# Patient Record
Sex: MALE | Race: WHITE | NOT HISPANIC OR LATINO | Employment: UNEMPLOYED | URBAN - METROPOLITAN AREA
[De-identification: names, ages, dates, MRNs, and addresses within clinical notes are randomized per-mention and may not be internally consistent; named-entity substitution may affect disease eponyms.]

---

## 2017-05-13 PROBLEM — F15.10 METHAMPHETAMINE ABUSE: Status: ACTIVE | Noted: 2017-05-13

## 2018-05-14 ENCOUNTER — OFFICE VISIT (OUTPATIENT)
Dept: URGENT CARE | Facility: CLINIC | Age: 28
End: 2018-05-14
Payer: MEDICAID

## 2018-05-14 VITALS
HEART RATE: 89 BPM | WEIGHT: 147 LBS | HEIGHT: 70 IN | OXYGEN SATURATION: 99 % | RESPIRATION RATE: 18 BRPM | TEMPERATURE: 99 F | DIASTOLIC BLOOD PRESSURE: 77 MMHG | BODY MASS INDEX: 21.05 KG/M2 | SYSTOLIC BLOOD PRESSURE: 126 MMHG

## 2018-05-14 DIAGNOSIS — J01.40 ACUTE NON-RECURRENT PANSINUSITIS: ICD-10-CM

## 2018-05-14 DIAGNOSIS — J02.9 ACUTE PHARYNGITIS, UNSPECIFIED ETIOLOGY: Primary | ICD-10-CM

## 2018-05-14 PROCEDURE — 99203 OFFICE O/P NEW LOW 30 MIN: CPT | Mod: S$GLB,,, | Performed by: FAMILY MEDICINE

## 2018-05-14 RX ORDER — DEXAMETHASONE SODIUM PHOSPHATE 100 MG/10ML
5 INJECTION INTRAMUSCULAR; INTRAVENOUS
Status: COMPLETED | OUTPATIENT
Start: 2018-05-14 | End: 2018-05-14

## 2018-05-14 RX ORDER — CEFDINIR 300 MG/1
300 CAPSULE ORAL 2 TIMES DAILY
Qty: 20 CAPSULE | Refills: 0 | Status: SHIPPED | OUTPATIENT
Start: 2018-05-14 | End: 2018-05-24

## 2018-05-14 RX ADMIN — DEXAMETHASONE SODIUM PHOSPHATE 5 MG: 100 INJECTION INTRAMUSCULAR; INTRAVENOUS at 01:05

## 2018-05-14 NOTE — LETTER
May 14, 2018  Ismael Gordon Jr.  [address] [address]                [facility]  [address], [address]  [facility] [address]  Phone: [phone]  Fax: [phone] Ismael Gordon was seen and treated in our Urgent Care department   on 5/14/2018. He may return to work in 2 - 3 days.      If you have any questions or concerns, please don't hesitate to call.    Sincerely,        Deangelo Martinez MD

## 2018-05-14 NOTE — PATIENT INSTRUCTIONS
Please drink plenty of fluids.  Please get plenty of rest.  Please return here or go to the Emergency Department for any concerns or worsening of condition.  If you were given wait & see antibiotics, please wait 3-5 days before taking them, and only take them if your symptoms have worsened or not improved.  If you do begin taking the antibiotics, please take them to completion.  If you were prescribed antibiotics, please take them to completion.  If you were prescribed a narcotic medication, do not drive or operate heavy equipment or machinery while taking these medications.    You were given a decongestant (RESCON or POLY VENT Dm).  If your insurance does not cover it or you cannot afford it, it is ok to use the over the counter products listed below.  If you do not have Hypertension or any history of palpitations, it is ok to take over the counter Sudafed or Mucinex D or Allegra-D or Claritin-D or Zyrtec-D.  If you do take one of the above, it is ok to combine that with plain over the counter Mucinex or Allegra or Claritin or Zyrtec.  If for example you are taking Zyrtec -D, you can combine that with Mucinex, but not Mucinex-D.  If you are taking Mucinex-D, you can combine that with plain Allegra or Claritin or Zyrtec.   If you do have Hypertension or palpitations, it is safe to take Coricidin HBP for relief of sinus symptoms.    We recommend you take over the counter Flonase (Fluticasone) or another nasally inhaled steroid unless you are already taking one.  Nasal irrigation with a saline spray or Netti Pot like device per their directions is also recommended.  If not allergic, please take over the counter Tylenol (Acetaminophen) and/or Motrin (Ibuprofen) as directed for control of pain and/or fever.    Robitussin DM 2 teas every 4 hours as needed for cough.  If you  smoke, please stop smoking.    You were given an injection of steroid.  This will relieve swelling and inflammation and improve respiratory  symptoms.  It can raise your blood sugar if you are diabetic.    Please follow up with your primary care doctor or specialist as needed.  Primary Doctor No  None        Acute Bacterial Rhinosinusitis (ABRS)  Acute bacterial rhinosinusitis (ABRS) is an infection of your nasal cavity and sinuses. Its caused by bacteria. Acute means that youve had symptoms for less than 12 weeks.  Understanding your sinuses  The nasal cavity is the large air-filled space behind your nose. The sinuses are a group of spaces formed by the bones of your face. They connect with your nasal cavity. ABRS causes the tissue lining these spaces to become inflamed. Mucus may not drain normally. This leads to facial pain and other symptoms.  What causes ABRS?  ABRS most often follows an upper respiratory infection caused by a virus. Bacteria then infect the lining of your nasal cavity and sinuses. But you can also get ABRS if you have:  · Nasal allergies  · Long-term nasal swelling and congestion not caused by allergies  · Blockage in the nose  Symptoms of ABRS  The symptoms of ABRS may be different for each person, and can include:  · Nasal congestion  · Runny nose  · Fluid draining from the nose down the throat (postnasal drip)  · Headache  · Cough  · Pain in the sinuses  · Thick, colored fluid from the nose (mucus)  · Fever  Diagnosing ABRS  ABRS may be diagnosed if youve had an upper respiratory infection like a cold and cough for longer than 10 to 14 days. Your health care provider will ask about your symptoms and your medical history. The provider will check your vital signs, including your temperature. Youll have a physical exam. The health care provider will check your ears, nose, and throat. You likely wont need any tests. If ABRS comes back, you may have a culture or other tests.  Treatment for ABRS  Treatment may include:  · Antibiotic medicine. This is for symptoms that last for at least 10 to 14 days.  · Nasal corticosteroid  medicine. Drops or spray used in the nose can lessen swelling and congestion.  · Over-the-counter pain medicine. This is to lessen sinus pain and pressure.  · Nasal decongestant medicine. Spray or drops may help to lessen congestion. Do not use them for more than a few days.  · Salt wash (saline irrigation). This can help to loosen mucus.  Possible complications of ABRS  ABRS may come back or become long-term (chronic).  In rare cases, ABRS may cause complications such as:   · Inflamed tissue around the brain and spinal cord (meningitis)  · Inflamed tissue around the eyes (orbital cellulitis)  · Inflamed bones around the sinuses (osteitis)  These problems may need to be treated in a hospital with intravenous (IV) antibiotic medicine or surgery.  When to call the health care provider  Call your health care provider if you have any of the following:  · Symptoms that dont get better, or get worse  · Symptoms that dont get better after 3 to 5 days on antibiotics  · Trouble seeing  · Swelling around your eyes  · Confusion or trouble staying awake   Date Last Reviewed: 3/3/2015  © 1044-7884 [employer]. [address] [place] [address]. All rights reserved. This information is not intended as a substitute for professional medical care. Always follow your healthcare professional's instructions.      Pharyngitis: Strep (Presumed)    You have pharyngitis (sore throat). The cause is thought to be the streptococcus, or strep, bacterium. Strep throat infection can cause throat pain that is worse when swallowing, aching all over, headache, and fever. The infection may be spread by coughing, kissing, or touching others after touching your mouth or nose. Antibiotic medications are given to treat the infection.  Home care  · Rest at home. Drink plenty of fluids to avoid dehydration.  · No work or school for the first 2 days of taking the antibiotics. After this time, you will not be contagious. You can then  return to work or school if you are feeling better.   · The antibiotic medication must be taken for the full 10 days, even if you feel better. This is very important to ensure the infection is treated. It is also important to prevent drug-resistant organisms from developing. If you were given an antibiotic shot, no more antibiotics are needed.  · You may use acetaminophen or ibuprofen to control pain or fever, unless another medicine was prescribed for this. If you have chronic liver or kidney disease or ever had a stomach ulcer or GI bleeding, talk with your doctor before using these medicines.  · Throat lozenges or a throat-numbing sprays can help reduce throat pain. Gargling with warm salt water can also help. Dissolve 1/2 teaspoon of salt in 1 8 ounce glass of warm water.   · Avoid salty or spicy foods, which can irritate the throat.  Follow-up care  Follow up with your healthcare provider or our staff if you are not improving over the next week.  When to seek medical advice  Call your healthcare provider right away if any of these occur:  · Fever as directed by your doctor.   · New or worsening ear pain, sinus pain, or headache  · Painful lumps in the back of neck  · Stiff neck  · Lymph nodes are getting larger  · Inability to swallow liquids, excessive drooling, or inability to open mouth wide due to throat pain  · Signs of dehydration (very dark urine or no urine, sunken eyes, dizziness)  · Trouble breathing or noisy breathing  · Muffled voice  · New rash  Date Last Reviewed: 4/13/2015  © 7023-5178 [employer], "[employer] [address] [place] [address], [place] [address]. All rights reserved. This information is not intended as a substitute for professional medical care. Always follow your healthcare professional's instructions.

## 2018-05-14 NOTE — PROGRESS NOTES
"Subjective:       Patient ID: Ismael Gordon Jr. is a 27 y.o. male.    Vitals:  height is 5' 10" (1.778 m) and weight is 66.7 kg (147 lb). His temperature is 98.5 °F (36.9 °C). His blood pressure is 126/77 and his pulse is 89. His respiration is 18 and oxygen saturation is 99%.     Chief Complaint: Sinus Problem    Sinus Problem   Episode onset: 1 week. The problem has been gradually worsening since onset. There has been no fever. He is experiencing no pain. Associated symptoms include congestion, coughing and a sore throat. Pertinent negatives include no chills, ear pain, headaches, hoarse voice or shortness of breath. Past treatments include oral decongestants. The treatment provided no relief.     Review of Systems   Constitution: Negative for chills, fever and malaise/fatigue.   HENT: Positive for congestion and sore throat. Negative for ear pain and hoarse voice.    Eyes: Negative for blurred vision, discharge and redness.   Cardiovascular: Negative for chest pain, dyspnea on exertion and leg swelling.   Respiratory: Positive for cough. Negative for shortness of breath, sputum production and wheezing.    Skin: Negative for rash.   Musculoskeletal: Negative for back pain, joint pain and myalgias.   Gastrointestinal: Negative for abdominal pain, diarrhea, nausea and vomiting.   Neurological: Negative for headaches.   Psychiatric/Behavioral: The patient is not nervous/anxious.        Objective:      Physical Exam   Constitutional: He is oriented to person, place, and time. He appears well-developed and well-nourished. He is cooperative.  Non-toxic appearance. He does not appear ill. No distress.   HENT:   Head: Normocephalic and atraumatic.   Right Ear: Hearing, tympanic membrane, external ear and ear canal normal.   Left Ear: Hearing, tympanic membrane, external ear and ear canal normal.   Nose: No mucosal edema, rhinorrhea or nasal deformity. No epistaxis. Right sinus exhibits maxillary sinus tenderness and " frontal sinus tenderness. Left sinus exhibits maxillary sinus tenderness and frontal sinus tenderness.   Mouth/Throat: Uvula is midline and mucous membranes are normal. No trismus in the jaw. Normal dentition. No uvula swelling. Posterior oropharyngeal edema and posterior oropharyngeal erythema present.   Eyes: Conjunctivae and lids are normal. No scleral icterus.   Neck: Trachea normal, full passive range of motion without pain and phonation normal. Neck supple.   Cardiovascular: Normal rate, regular rhythm, normal heart sounds, intact distal pulses and normal pulses.    Pulmonary/Chest: Effort normal and breath sounds normal. No respiratory distress.   Abdominal: Soft. Normal appearance and bowel sounds are normal. He exhibits no distension. There is no tenderness.   Musculoskeletal: Normal range of motion. He exhibits no edema or deformity.   Neurological: He is alert and oriented to person, place, and time. He exhibits normal muscle tone. Coordination normal.   Skin: Skin is warm, dry and intact. He is not diaphoretic. No pallor.   Psychiatric: He has a normal mood and affect. His speech is normal and behavior is normal. Judgment and thought content normal. Cognition and memory are normal.   Nursing note and vitals reviewed.      Assessment:       1. Acute pharyngitis, unspecified etiology    2. Acute non-recurrent pansinusitis        Plan:         Acute pharyngitis, unspecified etiology    Acute non-recurrent pansinusitis  -     cefdinir (OMNICEF) 300 MG capsule; Take 1 capsule (300 mg total) by mouth 2 (two) times daily.  Dispense: 20 capsule; Refill: 0  -     pseudoephedrine-DM-guaifenesin (POLY-VENT DM) 60- mg Tab; Take 1 tablet by mouth every 6 (six) hours as needed.  Dispense: 20 tablet; Refill: 0  -     dexamethasone injection 5 mg; Inject 0.5 mLs (5 mg total) into the muscle one time.      Please drink plenty of fluids.  Please get plenty of rest.  Please return here or go to the Emergency  Department for any concerns or worsening of condition.  If you were given wait & see antibiotics, please wait 3-5 days before taking them, and only take them if your symptoms have worsened or not improved.  If you do begin taking the antibiotics, please take them to completion.  If you were prescribed antibiotics, please take them to completion.  If you were prescribed a narcotic medication, do not drive or operate heavy equipment or machinery while taking these medications.    You were given a decongestant (RESCON or POLY VENT Dm).  If your insurance does not cover it or you cannot afford it, it is ok to use the over the counter products listed below.  If you do not have Hypertension or any history of palpitations, it is ok to take over the counter Sudafed or Mucinex D or Allegra-D or Claritin-D or Zyrtec-D.  If you do take one of the above, it is ok to combine that with plain over the counter Mucinex or Allegra or Claritin or Zyrtec.  If for example you are taking Zyrtec -D, you can combine that with Mucinex, but not Mucinex-D.  If you are taking Mucinex-D, you can combine that with plain Allegra or Claritin or Zyrtec.   If you do have Hypertension or palpitations, it is safe to take Coricidin HBP for relief of sinus symptoms.    We recommend you take over the counter Flonase (Fluticasone) or another nasally inhaled steroid unless you are already taking one.  Nasal irrigation with a saline spray or Netti Pot like device per their directions is also recommended.  If not allergic, please take over the counter Tylenol (Acetaminophen) and/or Motrin (Ibuprofen) as directed for control of pain and/or fever.    Robitussin DM 2 teas every 4 hours as needed for cough.  If you  smoke, please stop smoking.    You were given an injection of steroid.  This will relieve swelling and inflammation and improve respiratory symptoms.  It can raise your blood sugar if you are diabetic.    Please follow up with your primary care  doctor or specialist as needed.  Primary Doctor No  None

## 2018-05-17 ENCOUNTER — TELEPHONE (OUTPATIENT)
Dept: URGENT CARE | Facility: CLINIC | Age: 28
End: 2018-05-17

## 2018-07-05 ENCOUNTER — OFFICE VISIT (OUTPATIENT)
Dept: URGENT CARE | Facility: CLINIC | Age: 28
End: 2018-07-05
Payer: MEDICAID

## 2018-07-05 VITALS
WEIGHT: 147 LBS | HEIGHT: 70 IN | OXYGEN SATURATION: 100 % | TEMPERATURE: 99 F | HEART RATE: 101 BPM | DIASTOLIC BLOOD PRESSURE: 98 MMHG | SYSTOLIC BLOOD PRESSURE: 140 MMHG | RESPIRATION RATE: 19 BRPM | BODY MASS INDEX: 21.05 KG/M2

## 2018-07-05 DIAGNOSIS — S50.01XA CONTUSION OF RIGHT ELBOW, INITIAL ENCOUNTER: ICD-10-CM

## 2018-07-05 DIAGNOSIS — M25.521 RIGHT ELBOW PAIN: Primary | ICD-10-CM

## 2018-07-05 PROCEDURE — 99214 OFFICE O/P EST MOD 30 MIN: CPT | Mod: S$GLB,,, | Performed by: FAMILY MEDICINE

## 2018-07-05 RX ORDER — NAPROXEN 500 MG/1
500 TABLET ORAL 2 TIMES DAILY WITH MEALS
Qty: 20 TABLET | Refills: 0 | Status: ON HOLD | OUTPATIENT
Start: 2018-07-05 | End: 2021-08-23

## 2018-07-05 RX ORDER — TRAMADOL HYDROCHLORIDE 50 MG/1
50 TABLET ORAL EVERY 6 HOURS PRN
Qty: 20 TABLET | Refills: 0 | Status: SHIPPED | OUTPATIENT
Start: 2018-07-05 | End: 2019-07-05

## 2018-07-05 NOTE — LETTER
July 5, 2018  Ismael Gordon Jr.  [address] [address]                [facility]  [address]  [facility] [address]  Phone: [phone]  Fax: [phone] Ismael Gordon was seen and treated in our Urgent Care department   on 7/5/2018. He may return to work in 2 - 3 days.      If you have any questions or concerns, please don't hesitate to call.    Sincerely,        Deangelo Martinez MD

## 2018-07-06 NOTE — PROGRESS NOTES
"Subjective:       Patient ID: Ismael Gordon Jr. is a 27 y.o. male.    Vitals:  height is 5' 10" (1.778 m) and weight is 66.7 kg (147 lb). His temperature is 98.9 °F (37.2 °C). His blood pressure is 140/98 (abnormal) and his pulse is 101. His respiration is 19 and oxygen saturation is 100%.     Chief Complaint: Elbow Pain    Elbow Pain   Episode onset: 2 weeks. The problem has been gradually worsening. Associated symptoms include joint swelling. Pertinent negatives include no abdominal pain, chest pain, chills, fever, headaches, nausea, rash, sore throat or vomiting. The symptoms are aggravated by bending. He has tried ice and acetaminophen for the symptoms. The treatment provided no relief.     Review of Systems   Constitution: Negative for chills and fever.   HENT: Negative for sore throat.    Eyes: Negative for blurred vision.   Cardiovascular: Negative for chest pain.   Respiratory: Negative for shortness of breath.    Skin: Negative for rash.   Musculoskeletal: Positive for joint pain (RT elbow/ pain radiating to forearm) and joint swelling. Negative for back pain.   Gastrointestinal: Negative for abdominal pain, diarrhea, nausea and vomiting.   Neurological: Negative for headaches.   Psychiatric/Behavioral: The patient is not nervous/anxious.        Objective:      Physical Exam   Constitutional: He is oriented to person, place, and time. He appears well-developed and well-nourished. He is cooperative.  Non-toxic appearance. He does not appear ill. No distress.   HENT:   Head: Normocephalic and atraumatic. Head is without abrasion, without contusion and without laceration.   Right Ear: Hearing, tympanic membrane, external ear and ear canal normal. No hemotympanum.   Left Ear: Hearing, tympanic membrane, external ear and ear canal normal. No hemotympanum.   Nose: Nose normal. No mucosal edema, rhinorrhea or nasal deformity. No epistaxis. Right sinus exhibits no maxillary sinus tenderness and no frontal sinus " tenderness. Left sinus exhibits no maxillary sinus tenderness and no frontal sinus tenderness.   Mouth/Throat: Uvula is midline, oropharynx is clear and moist and mucous membranes are normal. No trismus in the jaw. Normal dentition. No uvula swelling. No posterior oropharyngeal erythema.   Eyes: Conjunctivae, EOM and lids are normal. Pupils are equal, round, and reactive to light. Right eye exhibits no discharge. Left eye exhibits no discharge. No scleral icterus.   Sclera clear bilat   Neck: Trachea normal, normal range of motion, full passive range of motion without pain and phonation normal. Neck supple. No spinous process tenderness and no muscular tenderness present. No neck rigidity. No tracheal deviation present.   Cardiovascular: Normal rate, regular rhythm, normal heart sounds, intact distal pulses and normal pulses.    Pulmonary/Chest: Effort normal and breath sounds normal. No respiratory distress.   Abdominal: Soft. Normal appearance and bowel sounds are normal. He exhibits no distension, no pulsatile midline mass and no mass. There is no tenderness.   Musculoskeletal: He exhibits no edema or deformity.        Right elbow: He exhibits decreased range of motion and swelling. Tenderness found. Radial head tenderness noted.   Neurological: He is alert and oriented to person, place, and time. He has normal strength. No cranial nerve deficit or sensory deficit. He exhibits normal muscle tone. He displays no seizure activity. Coordination normal. GCS eye subscore is 4. GCS verbal subscore is 5. GCS motor subscore is 6.   Skin: Skin is warm, dry and intact. Capillary refill takes less than 2 seconds. No abrasion, no bruising, no burn, no ecchymosis and no laceration noted. He is not diaphoretic. No pallor.   Psychiatric: He has a normal mood and affect. His speech is normal and behavior is normal. Judgment and thought content normal. Cognition and memory are normal.   Nursing note and vitals reviewed.    Type  of Interpretation: ED Physician (Independently Interpreted).  Radiology Procedure Done: Right Elbow.  Interpretation: No fx seen.        Assessment:       1. Right elbow pain    2. Contusion of right elbow, initial encounter        Plan:         Right elbow pain  -     X-Ray Elbow Complete 3 views Right; Future; Expected date: 07/05/2018    Contusion of right elbow, initial encounter  -     naproxen (NAPROSYN) 500 MG tablet; Take 1 tablet (500 mg total) by mouth 2 (two) times daily with meals.  Dispense: 20 tablet; Refill: 0  -     traMADol (ULTRAM) 50 mg tablet; Take 1 tablet (50 mg total) by mouth every 6 (six) hours as needed for Pain.  Dispense: 20 tablet; Refill: 0  -     SLING FOR HOME USE      Please drink plenty of fluids.  Please get plenty of rest.  Please return here or go to the Emergency Department for any concerns or worsening of condition.  If you were prescribed a narcotic medication, do not drive or operate heavy equipment or machinery while taking these medications.  If you were not prescribed an anti-inflammatory medication, and if you do not have any history of stomach/intestinal ulcers, or kidney disease, or are not taking a blood thinner such as Coumadin, Plavix, Pradaxa, Eloquis, or Xaralta for example, it is OK to take over the counter Ibuprofen or Advil or Motrin or Aleve as directed.  Do not take these medications on an empty stomach.  Rest, ice, compression and elevation to the affected joint or limb as needed.    If you were given a sling, wear it for comfort until follow up as arranged.  If you were given or placed in a splint, wear it until your follow up visit or recheck.  If you  smoke, please stop smoking.       Please follow up with your primary care doctor or specialist as needed.    Primary Doctor No  None

## 2018-09-23 ENCOUNTER — OFFICE VISIT (OUTPATIENT)
Dept: URGENT CARE | Facility: CLINIC | Age: 28
End: 2018-09-23
Payer: MEDICAID

## 2018-09-23 VITALS
TEMPERATURE: 98 F | HEART RATE: 63 BPM | DIASTOLIC BLOOD PRESSURE: 75 MMHG | SYSTOLIC BLOOD PRESSURE: 123 MMHG | WEIGHT: 147 LBS | OXYGEN SATURATION: 98 % | HEIGHT: 70 IN | BODY MASS INDEX: 21.05 KG/M2

## 2018-09-23 DIAGNOSIS — S13.9XXA NECK SPRAIN, INITIAL ENCOUNTER: Primary | ICD-10-CM

## 2018-09-23 PROCEDURE — 99214 OFFICE O/P EST MOD 30 MIN: CPT | Mod: S$GLB,,, | Performed by: FAMILY MEDICINE

## 2018-09-23 RX ORDER — KETOROLAC TROMETHAMINE 30 MG/ML
30 INJECTION, SOLUTION INTRAMUSCULAR; INTRAVENOUS ONCE
Status: COMPLETED | OUTPATIENT
Start: 2018-09-23 | End: 2018-09-23

## 2018-09-23 RX ORDER — CHLORZOXAZONE 500 MG/1
500 TABLET ORAL 4 TIMES DAILY PRN
Qty: 40 TABLET | Refills: 0 | Status: SHIPPED | OUTPATIENT
Start: 2018-09-23 | End: 2018-10-03

## 2018-09-23 RX ORDER — NAPROXEN 500 MG/1
500 TABLET ORAL 2 TIMES DAILY WITH MEALS
Qty: 20 TABLET | Refills: 0 | Status: ON HOLD | OUTPATIENT
Start: 2018-09-23 | End: 2021-08-23

## 2018-09-23 RX ADMIN — KETOROLAC TROMETHAMINE 30 MG: 30 INJECTION, SOLUTION INTRAMUSCULAR; INTRAVENOUS at 02:09

## 2018-09-23 NOTE — PROGRESS NOTES
"Subjective:       Patient ID: Ismael Gordon Jr. is a 27 y.o. male.    Vitals:  height is 5' 10" (1.778 m) and weight is 66.7 kg (147 lb). His oral temperature is 98.1 °F (36.7 °C). His blood pressure is 123/75 and his pulse is 63. His oxygen saturation is 98%.     Chief Complaint: Back Pain    Pt believes he pulled a muscle in his upper back towards his left shoulder.      Back Pain   This is a new problem. The current episode started yesterday. The problem occurs constantly. The problem is unchanged. The pain is present in the thoracic spine. The quality of the pain is described as aching (tightness). The pain is at a severity of 7/10. The pain is moderate. The symptoms are aggravated by lying down. Pertinent negatives include no abdominal pain, chest pain, dysuria, fever, headaches, leg pain, numbness, paresis, perianal numbness, tingling or weakness. He has tried heat for the symptoms. The treatment provided mild relief.     Review of Systems   Constitution: Negative for chills, fever and weakness.   HENT: Negative for sore throat.    Eyes: Negative for blurred vision.   Cardiovascular: Negative for chest pain.   Respiratory: Negative for shortness of breath.    Skin: Negative for rash.   Musculoskeletal: Positive for back pain. Negative for joint pain, muscle cramps, muscle weakness and myalgias.        Pt states his upper back feels sore     Gastrointestinal: Negative for abdominal pain, diarrhea, nausea and vomiting.   Genitourinary: Negative for dysuria.   Neurological: Negative for headaches, numbness and tingling.   Psychiatric/Behavioral: The patient is not nervous/anxious.        Objective:      Physical Exam   Constitutional: He is oriented to person, place, and time. He appears well-developed and well-nourished. He is cooperative.  Non-toxic appearance. He does not appear ill. No distress.   HENT:   Head: Normocephalic and atraumatic. Head is without abrasion, without contusion and without " laceration.   Right Ear: Hearing, tympanic membrane, external ear and ear canal normal. No hemotympanum.   Left Ear: Hearing, tympanic membrane, external ear and ear canal normal. No hemotympanum.   Nose: Nose normal. No mucosal edema, rhinorrhea or nasal deformity. No epistaxis. Right sinus exhibits no maxillary sinus tenderness and no frontal sinus tenderness. Left sinus exhibits no maxillary sinus tenderness and no frontal sinus tenderness.   Mouth/Throat: Uvula is midline, oropharynx is clear and moist and mucous membranes are normal. No trismus in the jaw. Normal dentition. No uvula swelling. No posterior oropharyngeal erythema.   Eyes: Conjunctivae, EOM and lids are normal. Pupils are equal, round, and reactive to light. Right eye exhibits no discharge. Left eye exhibits no discharge. No scleral icterus.   Sclera clear bilat   Neck: Trachea normal, full passive range of motion without pain and phonation normal. Muscular tenderness present. No spinous process tenderness present. Neck rigidity present. Decreased range of motion present. No tracheal deviation present.       Cardiovascular: Normal rate, regular rhythm, normal heart sounds, intact distal pulses and normal pulses.   Pulmonary/Chest: Effort normal and breath sounds normal. No respiratory distress.   Abdominal: Soft. Normal appearance and bowel sounds are normal. He exhibits no distension, no pulsatile midline mass and no mass. There is no tenderness.   Musculoskeletal: He exhibits no edema or deformity.        Cervical back: He exhibits decreased range of motion, tenderness and spasm.        Back:    Neurological: He is alert and oriented to person, place, and time. He has normal strength. No cranial nerve deficit or sensory deficit. He exhibits normal muscle tone. He displays no seizure activity. Coordination normal. GCS eye subscore is 4. GCS verbal subscore is 5. GCS motor subscore is 6.   Skin: Skin is warm, dry and intact. Capillary refill takes  less than 2 seconds. No abrasion, no bruising, no burn, no ecchymosis and no laceration noted. He is not diaphoretic. No pallor.   Psychiatric: He has a normal mood and affect. His speech is normal and behavior is normal. Judgment and thought content normal. Cognition and memory are normal.   Nursing note and vitals reviewed.    Type of Interpretation: ED Physician (Independently Interpreted).  Interpretation: Cervical spasm, no fx.    T spine - no fx.      Assessment:       1. Neck sprain, initial encounter        Plan:         Neck sprain, initial encounter  -     ketorolac injection 30 mg; Inject 1 mL (30 mg total) into the muscle once.  -     X-Ray Cervical Spine 2 or 3 Views; Future; Expected date: 09/23/2018  -     X-Ray Thoracic Spine AP Lateral; Future; Expected date: 09/23/2018  -     naproxen (NAPROSYN) 500 MG tablet; Take 1 tablet (500 mg total) by mouth 2 (two) times daily with meals.  Dispense: 20 tablet; Refill: 0  -     chlorzoxazone (PARAFON FORTE) 500 mg Tab; Take 1 tablet (500 mg total) by mouth 4 (four) times daily as needed.  Dispense: 40 tablet; Refill: 0    Please drink plenty of fluids.  Please get plenty of rest.  Please return here or go to the Emergency Department for any concerns or worsening of condition.  If you were prescribed a narcotic medication, do not drive or operate heavy equipment or machinery while taking these medications.  If you were not prescribed an anti-inflammatory medication, and if you do not have any history of stomach/intestinal ulcers, or kidney disease, or are not taking a blood thinner such as Coumadin, Plavix, Pradaxa, Eloquis, or Xaralta for example, it is OK to take over the counter Ibuprofen or Advil or Motrin or Aleve as directed.  Do not take these medications on an empty stomach.  If you lose control of your bowel and/or bladder, please go to the nearest Emergency Department immediately.  If you lose sensation in between your legs by your genitalia and/or  rectum, please go to the nearest Emergency Department immediately.  If you lose control or sensation of any extremity, please go to the nearest Emergency Department immediately.    Moist heat (heating Pad) several times a day to back for relief and comfort.  If you  smoke, please stop smoking.    Please follow up with your primary care doctor or specialist as needed.  Primary Doctor No  None

## 2018-09-23 NOTE — LETTER
September 23, 2018  Ismael Gordon Jr.  [address] [address]                [facility]  [address], [address]  [place] [address]  Phone: [phone]  Fax: [phone] Ismael Gordon was seen and treated in our Urgent Care department   on 9/23/2018. He may return to work in 2 - 3 days.      If you have any questions or concerns, please don't hesitate to call.    Sincerely,        Deangelo Martinez MD

## 2018-09-23 NOTE — PATIENT INSTRUCTIONS
Please drink plenty of fluids.  Please get plenty of rest.  Please return here or go to the Emergency Department for any concerns or worsening of condition.  If you were prescribed a narcotic medication, do not drive or operate heavy equipment or machinery while taking these medications.  If you were not prescribed an anti-inflammatory medication, and if you do not have any history of stomach/intestinal ulcers, or kidney disease, or are not taking a blood thinner such as Coumadin, Plavix, Pradaxa, Eloquis, or Xaralta for example, it is OK to take over the counter Ibuprofen or Advil or Motrin or Aleve as directed.  Do not take these medications on an empty stomach.  If you lose control of your bowel and/or bladder, please go to the nearest Emergency Department immediately.  If you lose sensation in between your legs by your genitalia and/or rectum, please go to the nearest Emergency Department immediately.  If you lose control or sensation of any extremity, please go to the nearest Emergency Department immediately.    Moist heat (heating Pad) several times a day to back for relief and comfort.  If you  smoke, please stop smoking.    Please follow up with your primary care doctor or specialist as needed.  Primary Doctor No  None      General Neck and Back Pain    Both neck and back pain are usually caused by injury to the muscles or ligaments of the spine. Sometimes the disks that separate each bone of the spine may cause pain by pressing on a nearby nerve. Back and neck pain may appear after a sudden twisting or bending force (such as in a car accident), or sometimes after a simple awkward movement. In either case, muscle spasm is often present and adds to the pain.  Acute neck and back pain usually gets better in 1 to 2 weeks. Pain related to disk disease, arthritis in the spinal joints or spinal stenosis (narrowing of the spinal canal) can become chronic and last for months or years.  Back and neck pain are common  problems. Most people feel better in 1 or 2 weeks, and most of the rest in 1 to 2 months. Most people can remain active.  People experience and describe pain differently.  · Pain can be sharp, stabbing, shooting, aching, cramping, or burning  · Movement, standing, bending, lifting, sitting, or walking may worsen the pain  · Pain can be localized to one spot or area, or it can be more generalized  · Pain can spread or radiate upwards, downwards, to the front, or go down your arms  · Muscle spasm may occur.  Most of the time mechanical problems with the muscles or spine cause the pain. it is usually caused by an injury, whether known or not, to the muscles or ligaments. While illnesses can cause back pain, it is usually not caused by a serious illness. Pain is usually related to physical activity, whether sports, exercise, work, or normal activity. Sometimes it can occur without an identifiable cause. This can happen simply by stretching or moving wrong, without noting pain at the time. Other causes include:  · Overexertion, lifting, pushing, pulling incorrectly or too aggressively.  · Sudden twisting, bending or stretching from an accident (car or fall), or accidental movement.  · Poor posture  · Poor conditioning, lack of regular exercise  · Spinal disc disease or arthritis  · Stress  · Pregnancy, or illness like appendicitis, bladder or kidney infection, pelvic infections   Home care  · For neck pain: Use a comfortable pillow that supports the head and keeps the spine in a neutral position. The position of the head should not be tilted forward or backward.  · When in bed, try to find a position of comfort. A firm mattress is best. Try lying flat on your back with pillows under your knees. You can also try lying on your side with your knees bent up towards your chest and a pillow between your knees.  · At first, do not try to stretch out the sore spots. If there is a strain, it is not like the good soreness you get  after exercising without an injury. In this case, stretching may make it worse.  · Avoid prolonged sitting, long car rides or travel. This puts more stress on the lower back than standing or walking.  · During the first 24 to 72 hours after an injury, apply an ice pack to the painful area for 20 minutes and then remove it for 20 minutes over a period of 60 to 90 minutes or several times a day.   · You can alternate ice and heat therapies. Talk with your healthcare provider about the best treatment for your back or neck pain. As a safety precaution, do not use a heating pad at bedtime. Sleeping with a heating pad can lead to skin burns or tissue damage.  · Therapeutic massage can help relax the back and neck muscles without stretching them.  · Be aware of safe lifting methods and do not lift anything over 15 pounds until all the pain is gone.  Medications  Talk to your healthcare provider before using medicine, especially if you have other medical problems or are taking other medicines.  · You may use over-the-counter medicine to control pain, unless another pain medicine was prescribed. If you have chronic conditions like diabetes, liver or kidney disease, stomach ulcers,  gastrointestinal bleeding, or are taking blood thinner medicines.  · Be careful if you are given pain medicines, narcotics, or medicine for muscle spasm. They can cause drowsiness, and can affect your coordination, reflexes, and judgment. Do not drive or operate heavy machinery.  Follow-up care  Follow up with your healthcare provider, or as advised. Physical therapy or further tests may be needed.  If X-rays were taken, you will be notified of any new findings that may affect your care.  Call 911  Seek emergency medical care if any of the following occur:  · Trouble breathing  · Confusion  · Very drowsy or trouble awakening  · Fainting or loss of consciousness  · Rapid or very slow heart rate  · Loss of bowel or bladder control  When to seek  medical advice  Call your healthcare provider right away if any of these occur:  · Pain becomes worse or spreads into your arms or legs  · Weakness, numbness or pain in one or both arms or legs  · Numbness in the groin area  · Difficulty walking  · Fever of 100.4ºF (38ºC) or higher, or as directed by your healthcare provider  Date Last Reviewed: 7/1/2016 © 2000-2016 [employer]. [address] [place] [address], [place] [address]. All rights reserved. This information is not intended as a substitute for professional medical care. Always follow your healthcare professional's instructions.      Back Pain (Acute or Chronic)    Back pain is one of the most common problems. The good news is that most people feel better in 1 to 2 weeks, and most of the rest in 1 to 2 months. Most people can remain active.  People experience and describe pain differently; not everyone is the same.  · The pain can be sharp, stabbing, shooting, aching, cramping or burning.  · Movement, standing, bending, lifting, sitting, or walking may worsen pain.  · It can be localized to one spot or area, or it can be more generalized.  · It can spread or radiate upwards, to the front, or go down your arms or legs (sciatica).  · It can cause muscle spasm.  Most of the time, mechanical problems with the muscles or spine cause the pain. Mechanical problems are usually caused by an injury to the muscles or ligaments. While illness can cause back pain, it is usually not caused by a serious illness. Mechanical problems include:   · Physical activity such as sports, exercise, work, or normal activity  · Overexertion, lifting, pushing, pulling incorrectly or too aggressively  · Sudden twisting, bending, or stretching from an accident, or accidental movement  · Poor posture  · Stretching or moving wrong, without noticing pain at the time  · Poor coordination, lack of regular exercise (check with your doctor about this)  · Spinal disc disease or  arthritis  · Stress  Pain can also be related to pregnancy, or illness like appendicitis, bladder or kidney infections, pelvic infections, and many other things.  Acute back pain usually gets better in 1 to 2 weeks. Back pain related to disk disease, arthritis in the spinal joints or spinal stenosis (narrowing of the spinal canal) can become chronic and last for months or years.  Unless you had a physical injury (for example, a car accident or fall) X-rays are usually not needed for the initial evaluation of back pain. If pain continues and does not respond to medical treatment, X-rays and other tests may be needed.  Home care  Try these home care recommendations:  · When in bed, try to find a position of comfort. A firm mattress is best. Try lying flat on your back with pillows under your knees. You can also try lying on your side with your knees bent up towards your chest and a pillow between your knees.  · At first, do not try to stretch out the sore spots. If there is a strain, it is not like the good soreness you get after exercising without an injury. In this case, stretching may make it worse.  · Avoid prolong sitting, long car rides, or travel. This puts more stress on the lower back than standing or walking.  · During the first 24 to 72 hours after an acute injury or flare up of chronic back pain, apply an ice pack to the painful area for 20 minutes and then remove it for 20 minutes. Do this over a period of 60 to 90 minutes or several times a day. This will reduce swelling and pain. Wrap the ice pack in a thin towel or plastic to protect your skin.  · You can start with ice, then switch to heat. Heat (hot shower, hot bath, or heating pad) reduces pain and works well for muscle spasms. Heat can be applied to the painful area for 20 minutes then remove it for 20 minutes. Do this over a period of 60 to 90 minutes or several times a day. Do not sleep on a heating pad. It can lead to skin burns or tissue  damage.  · You can alternate ice and heat therapy. Talk with your doctor about the best treatment for your back pain.  · Therapeutic massage can help relax the back muscles without stretching them.  · Be aware of safe lifting methods and do not lift anything without stretching first.  Medicines  Talk to your doctor before using medicine, especially if you have other medical problems or are taking other medicines.  · You may use over-the-counter medicine as directed on the bottle to control pain, unless another pain medicine was prescribed. If you have chronic conditions like diabetes, liver or kidney disease, stomach ulcers, or gastrointestinal bleeding, or are taking blood thinners, talk to your doctor before taking any medicine.  · Be careful if you are given a prescription medicines, narcotics, or medicine for muscle spasms. They can cause drowsiness, affect your coordination, reflexes, and judgement. Do not drive or operate heavy machinery.  Follow-up care  Follow up with your healthcare provider, or as advised.   A radiologist will review any X-rays that were taken. Your provide will notify you of any new findings that may affect your care.  Call 911  Call emergency services if any of the following occur:  · Trouble breathing  · Confusion  · Very drowsy or trouble awakening  · Fainting or loss of consciousness  · Rapid or very slow heart rate  · Loss of bowel or bladder control  When to seek medical advice  Call your healthcare provider right away if any of these occur:   · Pain becomes worse or spreads to your legs  · Weakness or numbness in one or both legs  · Numbness in the groin or genital area  Date Last Reviewed: 7/1/2016  © 8039-4265 [employer]. [address] [place] [address], [place] [address]. All rights reserved. This information is not intended as a substitute for professional medical care. Always follow your healthcare professional's instructions.      Back Care Tips    Caring for your  back  These are things you can do to prevent a recurrence of acute back pain and to reduce symptoms from chronic back pain:  · Maintain a healthy weight. If you are overweight, losing weight will help most types of back pain.  · Exercise is an important part of recovery from most types of back pain. The muscles behind and in front of the spine support the back. This means strengthening both the back muscles and the abdominal muscles will provide better support for your spine.   · Swimming and brisk walking are good overall exercises to improve your fitness level.  · Practice safe lifting methods (below).  · Practice good posture when sitting, standing and walking. Avoid prolonged sitting. This puts more stress on the lower back than standing or walking.  · Wear quality shoes with sufficient arch support. Foot and ankle alignment can affect back symptoms. Women should avoid wearing high heels.  · Therapeutic massage can help relax the back muscles without stretching them.  · During the first 24 to 72 hours after an acute injury or flare-up of chronic back pain, apply an ice pack to the painful area for 20 minutes and then remove it for 20 minutes, over a period of 60 to 90 minutes, or several times a day. As a safety precaution, do not use a heating pad at bedtime. Sleeping on a heating pad can lead to skin burns or tissue damage.  · You can alternate ice and heat therapies.  Medications  Talk to your healthcare provider before using medicines, especially if you have other medical problems or are taking other medicines.  · You may use acetaminophen or ibuprofen to control pain, unless your healthcare provider prescribed other pain medicine. If you have chronic conditions like diabetes, liver or kidney disease, stomach ulcers, or gastrointestinal bleeding, or are taking blood thinners, talk with your healthcare provider before taking any medicines.  · Be careful if you are given prescription pain medicines, narcotics,  or medicine for muscle spasm. They can cause drowsiness, affect your coordination, reflexes, and judgment. Do not drive or operate heavy machinery while taking these types of medicines. Take prescription pain medicine only as prescribed by your healthcare provider.  Lumbar stretch  Here is a simple stretching exercise that will help relax muscle spasm and keep your back more limber. If exercise makes your back pain worse, dont do it.  · Lie on your back with your knees bent and both feet on the ground.  · Slowly raise your left knee to your chest as you flatten your lower back against the floor. Hold for 5 seconds.  · Relax and repeat the exercise with your right knee.  · Do 10 of these exercises for each leg.  Safe lifting method  · Dont bend over at the waist to lift an object off the floor.  Instead, bend your knees and hips in a squat.   · Keep your back and head upright  · Hold the object close to your body, directly in front of you.  · Straighten your legs to lift the object.   · Lower the object to the floor in the reverse fashion.  · If you must slide something across the floor, push it.  Posture tips  Sitting  Sit in chairs with straight backs or low-back support. Keep your knees lower than your hips, with your feet flat on the floor.  When driving, sit up straight. Adjust the seat forward so you are not leaning toward the steering wheel.  A small pillow or rolled towel behind your lower back may help if you are driving long distances.   Standing  When standing for long periods, shift most of your weight to one leg at a time. Alternate legs every few minutes.   Sleeping  The best way to sleep is on your side with your knees bent. Put a low pillow under your head to support your neck in a neutral spine position. Avoid thick pillows that bend your neck to one side. Put a pillow between your legs to further relax your lower back. If you sleep on your back, put pillows under your knees to support your legs in  a slightly flexed position. Use a firm mattress. If your mattress sags, replace it, or use a 1/2-inch plywood board under the mattress to add support.  Follow-up care  Follow up with your healthcare provider, or as advised.  If X-rays, a CT scan or an MRI scan were taken, they will be reviewed by a radiologist. You will be notified of any new findings that may affect your care.  Call 911  Seek emergency medical care if any of the following occur:  · Trouble breathing  · Confusion  · Very drowsy  · Fainting or loss of consciousness  · Rapid or very slow heart rate  · Loss of  bowel or bladder control  When to seek medical care  Call your healthcare provider if any of the following occur:  · Pain becomes worse or spreads to your arms or legs  · Weakness or numbness in one or both arms or legs  · Numbness in the groin area  Date Last Reviewed: 6/1/2016  © 8022-0331 [employer]. [address] [place] [address]. All rights reserved. This information is not intended as a substitute for professional medical care. Always follow your healthcare professional's instructions.              Neck Sprain or Strain  A sudden force that causes turning or bending of the neck can cause sprain or strain. An example would be the force from a car accident. This can stretch or tear muscles called a strain. It can also stretch or tear ligaments called a sprain. Either of these can cause neck pain. Sometimes neck pain occurs after a simple awkward movement. In either case, muscle spasm is commonly present and contributes to the pain.     Unless you had a forceful physical injury (for example, a car accident or fall), X-rays are usually not ordered for the initial evaluation of neck pain. If pain continues and dose not respond to medical treatment, X-rays and other tests may be performed at a later time.  Home care  · You may feel more soreness and spasm the first few days after the injury. Rest until symptoms begin to  improve.  · When lying down, use a comfortable pillow or a rolled towel that supports the head and keeps the spine in a neutral position. The position of the head should not be tilted forward or backward.  · Apply an ice pack over the injured area for 15 to 20 minutes every 3 to 6 hours. You should do this for the first 24 to 48 hours. You can make an ice pack by filling a plastic bag that seals at the top with ice cubes and then wrapping it with a thin towel. After 48 hours, apply heat (warm shower or warm bath) for 15 to 20 minutes several times a day, or alternate ice and heat.  · You may use over-the-counter pain medicine to control pain, unless another pain medicine was prescribed. If you have chronic liver or kidney disease or ever had a stomach ulcer or GI bleeding, talk with your healthcare provider before using these medicines.  · If a soft cervical collar was prescribed, it should be worn only for periods of increased pain. It should not be worn for more than 3 hours a day, or for a period longer than 1 to 2 weeks.  Follow-up care  Follow up with your healthcare provider as directed. Physical therapy may be needed.  Sometimes fractures dont show up on the first X-ray. Bruises and sprains can sometimes hurt as much as a fracture. These injuries can take time to heal completely. If your symptoms dont improve or they get worse, talk with your healthcare provider. You may need a repeat X-ray or other tests. If X-rays were taken, you will be told of any new findings that may affect your care.  Call 911  Call 911 if you have:  · Neck swelling, difficulty or painful swallowing  · Difficulty breathing  · Chest pain  When to seek medical advice  Call your healthcare provider right away if any of these occur:  · Pain becomes worse or spreads into your arms  · Weakness or numbness in one or both arms  Date Last Reviewed: 11/19/2015  © 0775-7847 [employer]. [address] [place] [address], [place] [address].  All rights reserved. This information is not intended as a substitute for professional medical care. Always follow your healthcare professional's instructions.

## 2019-07-14 NOTE — PATIENT INSTRUCTIONS
Please drink plenty of fluids.  Please get plenty of rest.  Please return here or go to the Emergency Department for any concerns or worsening of condition.  If you were prescribed a narcotic medication, do not drive or operate heavy equipment or machinery while taking these medications.  If you were not prescribed an anti-inflammatory medication, and if you do not have any history of stomach/intestinal ulcers, or kidney disease, or are not taking a blood thinner such as Coumadin, Plavix, Pradaxa, Eloquis, or Xaralta for example, it is OK to take over the counter Ibuprofen or Advil or Motrin or Aleve as directed.  Do not take these medications on an empty stomach.  Rest, ice, compression and elevation to the affected joint or limb as needed.    If you were given a sling, wear it for comfort until follow up as arranged.  If you were given or placed in a splint, wear it until your follow up visit or recheck.  If you  smoke, please stop smoking.       Please follow up with your primary care doctor or specialist as needed.    Primary Doctor No  None    Elbow Sprain    A sprain is a tearing of the ligaments that hold a joint together. This may take up to 6 weeks to fully heal, depending on how severe it is. Moderate to severe sprains are treated with a sling or splint. Minor sprains can be treated without any special support.  Home care  The following guidelines will help you care for your injury at home:  · Keep your arm elevated to reduce pain and swelling. When sitting or lying down keep your arm above the level of your heart. You can do this by placing your arm on a pillow that rests on your chest or on a pillow at your side. This is most important during the first 2 days (48 hours) after injury.  · Put an ice pack on the injured area. Do this for 20 minutes every 1 to 2 hours the first day. You can make an ice pack by wrapping a plastic bag of ice cubes in a thin towel. As the ice melts, be careful that the splint  doesnt get wet. Continue using the ice pack 3 to 4 times a day for the next 2 days. Then use the ice pack as needed to ease pain and swelling.  · If you were given a plaster or fiberglass splint, leave it on as advised, or until you see your healthcare provider. Keep it dry at all times. Bathe with your splint out of the water. Protect it with a large plastic bag, rubber-banded at the top end. If a fiberglass splint gets wet, you can dry it with a hair dryer. Once the splint is removed, move your elbow through its full range of motion several times a day. This will prevent stiffness.  · If you were given a sling only, begin gradual range-of-motion exercises after the first few days, unless told otherwise. This will prevent stiffness in the elbow. Stop wearing the sling once the pain is better.  · You may use acetaminophen or ibuprofen to control pain, unless another pain medicine was prescribed. If you have chronic liver or kidney disease, talk with your healthcare provider before using these medicines. Also talk with your provider if youve had a stomach ulcer or gastrointestinal bleeding.  Follow-up care  Follow up with your doctor as directed.  Any X-rays you had today dont show any broken bones, breaks, or fractures. Sometimes fractures dont show up on the first X-ray. Bruises and sprains can sometimes hurt as much as a fracture. These injuries can take time to heal completely. If your symptoms dont improve or they get worse, talk with your healthcare provider. You may need a repeat X-ray or other tests.  When to seek medical advice  Call your healthcare provider right away  if any of these occur:  · The plaster splint becomes wet or soft  · The fiberglass splint remains wet for more than 24 hours  · Bad odor from the splint or wound fluid stains the splint  · Splint cracks  · Tightness or pain in the elbow gets worse  · Fingers become swollen, cold, blue, numb, or tingly  · You are less able to move the  elbow, hand or fingers  · Area around splint becomes red, swollen, or irritated  · Fever of 101ºF (38.3ºC) or higher, or as directed by your healthcare provider  Date Last Reviewed: 5/1/2017 © 2000-2017 [employer]. [address] [place] [id number]. All rights reserved. This information is not intended as a substitute for professional medical care. Always follow your healthcare professional's instructions.           ETOP (2006 D&C, 2011 Pills); Uterine fibroids

## 2020-09-22 ENCOUNTER — HOSPITAL ENCOUNTER (EMERGENCY)
Facility: HOSPITAL | Age: 30
Discharge: HOME | End: 2020-09-22
Attending: SURGERY
Payer: MEDICAID

## 2020-09-22 VITALS
WEIGHT: 152.69 LBS | RESPIRATION RATE: 18 BRPM | HEART RATE: 95 BPM | SYSTOLIC BLOOD PRESSURE: 140 MMHG | OXYGEN SATURATION: 98 % | DIASTOLIC BLOOD PRESSURE: 89 MMHG | TEMPERATURE: 98 F | BODY MASS INDEX: 21.91 KG/M2

## 2020-09-22 DIAGNOSIS — L03.113 CELLULITIS OF RIGHT FOREARM: Primary | ICD-10-CM

## 2020-09-22 PROCEDURE — 99283 EMERGENCY DEPT VISIT LOW MDM: CPT

## 2020-09-22 PROCEDURE — 25000003 PHARM REV CODE 250: Performed by: INTERNAL MEDICINE

## 2020-09-22 RX ORDER — DOXYCYCLINE HYCLATE 100 MG
100 TABLET ORAL
Status: COMPLETED | OUTPATIENT
Start: 2020-09-22 | End: 2020-09-22

## 2020-09-22 RX ORDER — DOXYCYCLINE 100 MG/1
100 CAPSULE ORAL 2 TIMES DAILY
Qty: 20 CAPSULE | Refills: 0 | Status: SHIPPED | OUTPATIENT
Start: 2020-09-22 | End: 2020-10-02

## 2020-09-22 RX ADMIN — DOXYCYCLINE HYCLATE 100 MG: 100 TABLET, COATED ORAL at 11:09

## 2020-09-23 NOTE — ED PROVIDER NOTES
"Encounter Date: 9/22/2020       History     Chief Complaint   Patient presents with    Arm Pain     29-year-old male presents to the emergency department complaining lesion to right forearm times 2 days.  He denies fever/chills/nausea/vomiting/chest pain/shortness of breath. He states he squeezed lesion, but has not seen fluid come from the lesion.    The history is provided by the patient. No  was used.     Review of patient's allergies indicates:  No Known Allergies  Past Medical History:   Diagnosis Date    Bipolar 1 disorder     Depression     Hepatitis C     History of psychiatric hospitalization     "It has all been to get to previous 28 day programs."    Hx of psychiatric care     Psychiatric exam requested by authority     Psychiatric problem     Suicide attempt 01/16/2017    'I did an overdose on heroin so you could say passive suicide in my opinion."    Therapy      Past Surgical History:   Procedure Laterality Date    CHOLECYSTECTOMY       Family History   Problem Relation Age of Onset    Alcohol abuse Mother     No Known Problems Father     No Known Problems Sister     Drug abuse Brother     Anxiety disorder Maternal Aunt     Drug abuse Maternal Aunt     Drug abuse Paternal Aunt     Drug abuse Maternal Uncle     Anxiety disorder Maternal Uncle     Drug abuse Paternal Uncle     No Known Problems Maternal Grandfather     No Known Problems Maternal Grandmother     No Known Problems Paternal Grandfather     No Known Problems Paternal Grandmother     Drug abuse Cousin     Anxiety disorder Cousin     Alcohol abuse Cousin     No Known Problems Maternal Aunt     Drug abuse Maternal Aunt     No Known Problems Paternal Cousin      Social History     Tobacco Use    Smoking status: Current Every Day Smoker     Packs/day: 0.25     Years: 7.00     Pack years: 1.75     Types: Cigarettes     Start date: 2009    Smokeless tobacco: Current User    Tobacco comment: "By " "default I have quit because I can no longer afford it."    Substance Use Topics    Alcohol use: No    Drug use: Yes     Types: Methamphetamines, Marijuana     Comment: Feb 2015 heroin "but very rarely but when I do I blow out."     Review of Systems   Constitutional: Negative for chills and fever.   Skin:        Skin lesion   All other systems reviewed and are negative.      Physical Exam     Initial Vitals [09/22/20 2315]   BP Pulse Resp Temp SpO2   (!) 140/89 95 18 98.2 °F (36.8 °C) 98 %      MAP       --         Physical Exam    Nursing note and vitals reviewed.  Constitutional: He appears well-developed. He is not diaphoretic. No distress.   HENT:   Head: Normocephalic and atraumatic.   Eyes: Conjunctivae are normal.   Cardiovascular: Normal rate and regular rhythm.   Pulmonary/Chest: Breath sounds normal. No respiratory distress.   Abdominal: Soft.   Musculoskeletal: Normal range of motion.   Neurological: He is alert.   Skin: Skin is warm and dry.   Dorsal right forearm with induration, erythema (approximately 4 cm diameter), and no fluctuance.  There is a central ulcerated area approximately 0.5 cm diameter without drainage from the lesion   Psychiatric: He has a normal mood and affect.         ED Course   Procedures  Labs Reviewed - No data to display       Imaging Results    None          Medical Decision Making:   Initial Assessment:   29-year-old male presents to the emergency department complaining lesion to right forearm times 2 days.  He denies fever/chills/nausea/vomiting/chest pain/shortness of breath. He states he squeezed lesion, but has not seen fluid come from the lesion.  ED Management:  Patient was given instructions for cellulitis of the right forearm and received doxycycline in the emergency department as well as a prescription for doxycycline.  He was advised to follow up with his primary care physician within the next 3 days for re-evaluation/return to the emergency department if " condition worsens.                             Clinical Impression:     ICD-10-CM ICD-9-CM   1. Cellulitis of right forearm  L03.113 682.3                          ED Disposition Condition    Discharge Stable        ED Prescriptions     Medication Sig Dispense Start Date End Date Auth. Provider    doxycycline (VIBRAMYCIN) 100 MG Cap Take 1 capsule (100 mg total) by mouth 2 (two) times daily. for 10 days 20 capsule 9/22/2020 10/2/2020 Jaden Ford MD        Follow-up Information    None                                      Jaden Ford MD  09/22/20 2413

## 2020-09-23 NOTE — ED TRIAGE NOTES
Pt presents with abscess to right arm that he has had for about 2 days. States in the past twelve hours it has gotten red, painful, and began swelling. Denies fever, chills, nausea, vomiting, diarrhea. No meds PTA

## 2021-01-01 ENCOUNTER — HOSPITAL ENCOUNTER (EMERGENCY)
Facility: HOSPITAL | Age: 31
Discharge: HOME | End: 2021-01-01
Attending: SURGERY
Payer: MEDICAID

## 2021-01-01 VITALS
HEIGHT: 70 IN | OXYGEN SATURATION: 100 % | HEART RATE: 97 BPM | BODY MASS INDEX: 23.17 KG/M2 | TEMPERATURE: 98 F | SYSTOLIC BLOOD PRESSURE: 136 MMHG | DIASTOLIC BLOOD PRESSURE: 87 MMHG | RESPIRATION RATE: 20 BRPM | WEIGHT: 161.81 LBS

## 2021-01-01 DIAGNOSIS — L02.91 ABSCESS: Primary | ICD-10-CM

## 2021-01-01 PROCEDURE — 90715 TDAP VACCINE 7 YRS/> IM: CPT | Performed by: SURGERY

## 2021-01-01 PROCEDURE — 10060 I&D ABSCESS SIMPLE/SINGLE: CPT | Mod: LT

## 2021-01-01 PROCEDURE — 25000003 PHARM REV CODE 250: Performed by: SURGERY

## 2021-01-01 PROCEDURE — 90471 IMMUNIZATION ADMIN: CPT | Performed by: SURGERY

## 2021-01-01 PROCEDURE — 63600175 PHARM REV CODE 636 W HCPCS: Performed by: SURGERY

## 2021-01-01 PROCEDURE — 99284 EMERGENCY DEPT VISIT MOD MDM: CPT | Mod: 25

## 2021-01-01 RX ORDER — CLINDAMYCIN HYDROCHLORIDE 300 MG/1
300 CAPSULE ORAL 4 TIMES DAILY
Qty: 28 CAPSULE | Refills: 0 | Status: SHIPPED | OUTPATIENT
Start: 2021-01-01 | End: 2021-01-08

## 2021-01-01 RX ORDER — LIDOCAINE HYDROCHLORIDE 10 MG/ML
10 INJECTION, SOLUTION EPIDURAL; INFILTRATION; INTRACAUDAL; PERINEURAL
Status: COMPLETED | OUTPATIENT
Start: 2021-01-01 | End: 2021-01-01

## 2021-01-01 RX ORDER — IBUPROFEN 800 MG/1
800 TABLET ORAL EVERY 6 HOURS PRN
Qty: 20 TABLET | Refills: 0 | Status: ON HOLD | OUTPATIENT
Start: 2021-01-01 | End: 2021-08-23

## 2021-01-01 RX ORDER — MUPIROCIN 20 MG/G
1 OINTMENT TOPICAL
Status: COMPLETED | OUTPATIENT
Start: 2021-01-01 | End: 2021-01-01

## 2021-01-01 RX ORDER — MUPIROCIN 20 MG/G
OINTMENT TOPICAL 3 TIMES DAILY
Qty: 15 G | Refills: 0 | Status: SHIPPED | OUTPATIENT
Start: 2021-01-01 | End: 2021-01-11

## 2021-01-01 RX ADMIN — LIDOCAINE HYDROCHLORIDE 100 MG: 10 INJECTION, SOLUTION EPIDURAL; INFILTRATION; INTRACAUDAL; PERINEURAL at 02:01

## 2021-01-01 RX ADMIN — CLOSTRIDIUM TETANI TOXOID ANTIGEN (FORMALDEHYDE INACTIVATED), CORYNEBACTERIUM DIPHTHERIAE TOXOID ANTIGEN (FORMALDEHYDE INACTIVATED), BORDETELLA PERTUSSIS TOXOID ANTIGEN (GLUTARALDEHYDE INACTIVATED), BORDETELLA PERTUSSIS FILAMENTOUS HEMAGGLUTININ ANTIGEN (FORMALDEHYDE INACTIVATED), BORDETELLA PERTUSSIS PERTACTIN ANTIGEN, AND BORDETELLA PERTUSSIS FIMBRIAE 2/3 ANTIGEN 0.5 ML: 5; 2; 2.5; 5; 3; 5 INJECTION, SUSPENSION INTRAMUSCULAR at 02:01

## 2021-01-01 RX ADMIN — MUPIROCIN 22 G: 20 OINTMENT TOPICAL at 02:01

## 2021-01-02 ENCOUNTER — HOSPITAL ENCOUNTER (EMERGENCY)
Facility: HOSPITAL | Age: 31
Discharge: LEFT BEFORE DISCHARGE | End: 2021-01-02
Attending: SURGERY
Payer: MEDICAID

## 2021-01-02 VITALS
DIASTOLIC BLOOD PRESSURE: 82 MMHG | HEART RATE: 93 BPM | SYSTOLIC BLOOD PRESSURE: 139 MMHG | BODY MASS INDEX: 23.1 KG/M2 | WEIGHT: 161 LBS | OXYGEN SATURATION: 98 % | RESPIRATION RATE: 18 BRPM | TEMPERATURE: 99 F

## 2021-01-02 DIAGNOSIS — Z53.29 LEFT AGAINST MEDICAL ADVICE: Primary | ICD-10-CM

## 2021-01-02 DIAGNOSIS — L02.91 ABSCESS: ICD-10-CM

## 2021-01-02 PROCEDURE — 99281 EMR DPT VST MAYX REQ PHY/QHP: CPT

## 2021-01-02 RX ORDER — CLINDAMYCIN PHOSPHATE 600 MG/50ML
600 INJECTION, SOLUTION INTRAVENOUS
Status: DISCONTINUED | OUTPATIENT
Start: 2021-01-02 | End: 2021-01-02 | Stop reason: HOSPADM

## 2021-08-23 ENCOUNTER — HOSPITAL ENCOUNTER (INPATIENT)
Facility: HOSPITAL | Age: 31
LOS: 4 days | Discharge: HOME | DRG: 885 | End: 2021-08-27
Attending: PSYCHIATRY & NEUROLOGY | Admitting: PSYCHIATRY & NEUROLOGY
Payer: MEDICAID

## 2021-08-23 DIAGNOSIS — F15.10 METHAMPHETAMINE ABUSE: ICD-10-CM

## 2021-08-23 DIAGNOSIS — F19.10 SUBSTANCE ABUSE: ICD-10-CM

## 2021-08-23 DIAGNOSIS — F11.90 OPIOID USE DISORDER: Primary | ICD-10-CM

## 2021-08-23 LAB
CHOLEST SERPL-MCNC: 122 MG/DL (ref 120–199)
CHOLEST/HDLC SERPL: 4.4 {RATIO} (ref 2–5)
ESTIMATED AVG GLUCOSE: 100 MG/DL (ref 68–131)
HBA1C MFR BLD: 5.1 % (ref 4–5.6)
HDLC SERPL-MCNC: 28 MG/DL (ref 40–75)
HDLC SERPL: 23 % (ref 20–50)
LDLC SERPL CALC-MCNC: 69.6 MG/DL (ref 63–159)
NONHDLC SERPL-MCNC: 94 MG/DL
TRIGL SERPL-MCNC: 122 MG/DL (ref 30–150)

## 2021-08-23 PROCEDURE — 11400000 HC PSYCH PRIVATE ROOM

## 2021-08-23 PROCEDURE — 80061 LIPID PANEL: CPT | Performed by: PSYCHIATRY & NEUROLOGY

## 2021-08-23 PROCEDURE — 99223 1ST HOSP IP/OBS HIGH 75: CPT | Mod: AF,HB,, | Performed by: PSYCHIATRY & NEUROLOGY

## 2021-08-23 PROCEDURE — 36415 COLL VENOUS BLD VENIPUNCTURE: CPT | Performed by: PSYCHIATRY & NEUROLOGY

## 2021-08-23 PROCEDURE — 83036 HEMOGLOBIN GLYCOSYLATED A1C: CPT | Performed by: PSYCHIATRY & NEUROLOGY

## 2021-08-23 PROCEDURE — 99221 1ST HOSP IP/OBS SF/LOW 40: CPT | Mod: ,,, | Performed by: NURSE PRACTITIONER

## 2021-08-23 PROCEDURE — 99221 PR INITIAL HOSPITAL CARE,LEVL I: ICD-10-PCS | Mod: ,,, | Performed by: NURSE PRACTITIONER

## 2021-08-23 PROCEDURE — 99223 PR INITIAL HOSPITAL CARE,LEVL III: ICD-10-PCS | Mod: AF,HB,, | Performed by: PSYCHIATRY & NEUROLOGY

## 2021-08-23 PROCEDURE — 25000003 PHARM REV CODE 250: Performed by: PSYCHIATRY & NEUROLOGY

## 2021-08-23 RX ORDER — IBUPROFEN 200 MG
1 TABLET ORAL DAILY
Status: DISCONTINUED | OUTPATIENT
Start: 2021-08-24 | End: 2021-08-27 | Stop reason: HOSPADM

## 2021-08-23 RX ORDER — OLANZAPINE 10 MG/1
10 TABLET ORAL EVERY 4 HOURS PRN
Status: DISCONTINUED | OUTPATIENT
Start: 2021-08-23 | End: 2021-08-27 | Stop reason: HOSPADM

## 2021-08-23 RX ORDER — ACETAMINOPHEN 325 MG/1
650 TABLET ORAL EVERY 6 HOURS PRN
Status: DISCONTINUED | OUTPATIENT
Start: 2021-08-23 | End: 2021-08-27 | Stop reason: HOSPADM

## 2021-08-23 RX ORDER — DIAZEPAM 5 MG/1
5 TABLET ORAL ONCE
Status: COMPLETED | OUTPATIENT
Start: 2021-08-23 | End: 2021-08-23

## 2021-08-23 RX ORDER — OLANZAPINE 10 MG/2ML
10 INJECTION, POWDER, FOR SOLUTION INTRAMUSCULAR EVERY 4 HOURS PRN
Status: DISCONTINUED | OUTPATIENT
Start: 2021-08-23 | End: 2021-08-27 | Stop reason: HOSPADM

## 2021-08-23 RX ORDER — HYDROXYZINE PAMOATE 50 MG/1
50 CAPSULE ORAL EVERY 6 HOURS PRN
Status: DISCONTINUED | OUTPATIENT
Start: 2021-08-23 | End: 2021-08-27 | Stop reason: HOSPADM

## 2021-08-23 RX ORDER — DIAZEPAM 5 MG/1
5 TABLET ORAL 4 TIMES DAILY
Status: DISCONTINUED | OUTPATIENT
Start: 2021-08-23 | End: 2021-08-25

## 2021-08-23 RX ORDER — TRAMADOL HYDROCHLORIDE 50 MG/1
50 TABLET ORAL 4 TIMES DAILY
Status: DISCONTINUED | OUTPATIENT
Start: 2021-08-23 | End: 2021-08-25

## 2021-08-23 RX ADMIN — DIAZEPAM 5 MG: 5 TABLET ORAL at 04:08

## 2021-08-23 RX ADMIN — TRAMADOL HYDROCHLORIDE 50 MG: 50 TABLET ORAL at 08:08

## 2021-08-23 RX ADMIN — ACETAMINOPHEN 650 MG: 325 TABLET ORAL at 11:08

## 2021-08-23 RX ADMIN — DIAZEPAM 5 MG: 5 TABLET ORAL at 08:08

## 2021-08-23 RX ADMIN — TRAMADOL HYDROCHLORIDE 50 MG: 50 TABLET ORAL at 04:08

## 2021-08-23 RX ADMIN — TRAMADOL HYDROCHLORIDE 50 MG: 50 TABLET ORAL at 02:08

## 2021-08-23 RX ADMIN — DIAZEPAM 5 MG: 5 TABLET ORAL at 02:08

## 2021-08-23 RX ADMIN — DIAZEPAM 5 MG: 5 TABLET ORAL at 12:08

## 2021-08-24 PROCEDURE — 90833 PR PSYCHOTHERAPY W/PATIENT W/E&M, 30 MIN (ADD ON): ICD-10-PCS | Mod: AF,HB,, | Performed by: STUDENT IN AN ORGANIZED HEALTH CARE EDUCATION/TRAINING PROGRAM

## 2021-08-24 PROCEDURE — 99233 PR SUBSEQUENT HOSPITAL CARE,LEVL III: ICD-10-PCS | Mod: AF,HB,, | Performed by: STUDENT IN AN ORGANIZED HEALTH CARE EDUCATION/TRAINING PROGRAM

## 2021-08-24 PROCEDURE — 25000003 PHARM REV CODE 250: Performed by: STUDENT IN AN ORGANIZED HEALTH CARE EDUCATION/TRAINING PROGRAM

## 2021-08-24 PROCEDURE — 99233 SBSQ HOSP IP/OBS HIGH 50: CPT | Mod: AF,HB,, | Performed by: STUDENT IN AN ORGANIZED HEALTH CARE EDUCATION/TRAINING PROGRAM

## 2021-08-24 PROCEDURE — S4991 NICOTINE PATCH NONLEGEND: HCPCS | Performed by: PSYCHIATRY & NEUROLOGY

## 2021-08-24 PROCEDURE — 11400000 HC PSYCH PRIVATE ROOM

## 2021-08-24 PROCEDURE — 90833 PSYTX W PT W E/M 30 MIN: CPT | Mod: AF,HB,, | Performed by: STUDENT IN AN ORGANIZED HEALTH CARE EDUCATION/TRAINING PROGRAM

## 2021-08-24 PROCEDURE — 25000003 PHARM REV CODE 250: Performed by: PSYCHIATRY & NEUROLOGY

## 2021-08-24 RX ORDER — LOPERAMIDE HYDROCHLORIDE 2 MG/1
2 CAPSULE ORAL 4 TIMES DAILY PRN
Status: DISCONTINUED | OUTPATIENT
Start: 2021-08-24 | End: 2021-08-27 | Stop reason: HOSPADM

## 2021-08-24 RX ORDER — DICYCLOMINE HYDROCHLORIDE 10 MG/1
10 CAPSULE ORAL 4 TIMES DAILY PRN
Status: DISCONTINUED | OUTPATIENT
Start: 2021-08-24 | End: 2021-08-27 | Stop reason: HOSPADM

## 2021-08-24 RX ORDER — IBUPROFEN 600 MG/1
600 TABLET ORAL EVERY 6 HOURS PRN
Status: DISCONTINUED | OUTPATIENT
Start: 2021-08-24 | End: 2021-08-27 | Stop reason: HOSPADM

## 2021-08-24 RX ORDER — MIRTAZAPINE 15 MG/1
15 TABLET, ORALLY DISINTEGRATING ORAL NIGHTLY
Status: DISCONTINUED | OUTPATIENT
Start: 2021-08-24 | End: 2021-08-25

## 2021-08-24 RX ORDER — METHOCARBAMOL 750 MG/1
750 TABLET, FILM COATED ORAL 4 TIMES DAILY PRN
Status: DISCONTINUED | OUTPATIENT
Start: 2021-08-24 | End: 2021-08-25

## 2021-08-24 RX ADMIN — OLANZAPINE 10 MG: 10 TABLET, FILM COATED ORAL at 10:08

## 2021-08-24 RX ADMIN — DICYCLOMINE HYDROCHLORIDE 10 MG: 10 CAPSULE ORAL at 11:08

## 2021-08-24 RX ADMIN — TRAMADOL HYDROCHLORIDE 50 MG: 50 TABLET ORAL at 08:08

## 2021-08-24 RX ADMIN — DICYCLOMINE HYDROCHLORIDE 10 MG: 10 CAPSULE ORAL at 10:08

## 2021-08-24 RX ADMIN — TRAMADOL HYDROCHLORIDE 50 MG: 50 TABLET ORAL at 09:08

## 2021-08-24 RX ADMIN — ACETAMINOPHEN 650 MG: 325 TABLET ORAL at 03:08

## 2021-08-24 RX ADMIN — HYDROXYZINE PAMOATE 50 MG: 50 CAPSULE ORAL at 01:08

## 2021-08-24 RX ADMIN — DIAZEPAM 5 MG: 5 TABLET ORAL at 12:08

## 2021-08-24 RX ADMIN — DIAZEPAM 5 MG: 5 TABLET ORAL at 09:08

## 2021-08-24 RX ADMIN — HYDROXYZINE PAMOATE 50 MG: 50 CAPSULE ORAL at 09:08

## 2021-08-24 RX ADMIN — NICOTINE 1 PATCH: 14 PATCH, EXTENDED RELEASE TRANSDERMAL at 08:08

## 2021-08-24 RX ADMIN — ACETAMINOPHEN 650 MG: 325 TABLET ORAL at 10:08

## 2021-08-24 RX ADMIN — TRAMADOL HYDROCHLORIDE 50 MG: 50 TABLET ORAL at 04:08

## 2021-08-24 RX ADMIN — DIAZEPAM 5 MG: 5 TABLET ORAL at 04:08

## 2021-08-24 RX ADMIN — TRAMADOL HYDROCHLORIDE 50 MG: 50 TABLET ORAL at 12:08

## 2021-08-24 RX ADMIN — DIAZEPAM 5 MG: 5 TABLET ORAL at 08:08

## 2021-08-24 RX ADMIN — MIRTAZAPINE 15 MG: 15 TABLET, ORALLY DISINTEGRATING ORAL at 08:08

## 2021-08-24 RX ADMIN — METHOCARBAMOL 750 MG: 750 TABLET ORAL at 11:08

## 2021-08-25 PROCEDURE — 99233 SBSQ HOSP IP/OBS HIGH 50: CPT | Mod: AF,HB,, | Performed by: STUDENT IN AN ORGANIZED HEALTH CARE EDUCATION/TRAINING PROGRAM

## 2021-08-25 PROCEDURE — 25000003 PHARM REV CODE 250: Performed by: STUDENT IN AN ORGANIZED HEALTH CARE EDUCATION/TRAINING PROGRAM

## 2021-08-25 PROCEDURE — 25000003 PHARM REV CODE 250: Performed by: PSYCHIATRY & NEUROLOGY

## 2021-08-25 PROCEDURE — 99233 PR SUBSEQUENT HOSPITAL CARE,LEVL III: ICD-10-PCS | Mod: AF,HB,, | Performed by: STUDENT IN AN ORGANIZED HEALTH CARE EDUCATION/TRAINING PROGRAM

## 2021-08-25 PROCEDURE — 11400000 HC PSYCH PRIVATE ROOM

## 2021-08-25 PROCEDURE — S4991 NICOTINE PATCH NONLEGEND: HCPCS | Performed by: PSYCHIATRY & NEUROLOGY

## 2021-08-25 RX ORDER — METHOCARBAMOL 500 MG/1
1000 TABLET, FILM COATED ORAL 4 TIMES DAILY PRN
Status: DISCONTINUED | OUTPATIENT
Start: 2021-08-25 | End: 2021-08-27 | Stop reason: HOSPADM

## 2021-08-25 RX ORDER — MIRTAZAPINE 30 MG/1
30 TABLET, ORALLY DISINTEGRATING ORAL NIGHTLY
Status: DISCONTINUED | OUTPATIENT
Start: 2021-08-25 | End: 2021-08-27 | Stop reason: HOSPADM

## 2021-08-25 RX ORDER — DIAZEPAM 5 MG/1
5 TABLET ORAL 3 TIMES DAILY
Status: DISCONTINUED | OUTPATIENT
Start: 2021-08-25 | End: 2021-08-26

## 2021-08-25 RX ORDER — TRAMADOL HYDROCHLORIDE 50 MG/1
50 TABLET ORAL 3 TIMES DAILY
Status: DISCONTINUED | OUTPATIENT
Start: 2021-08-25 | End: 2021-08-26

## 2021-08-25 RX ADMIN — DIAZEPAM 5 MG: 5 TABLET ORAL at 08:08

## 2021-08-25 RX ADMIN — TRAMADOL HYDROCHLORIDE 50 MG: 50 TABLET ORAL at 02:08

## 2021-08-25 RX ADMIN — NICOTINE 1 PATCH: 14 PATCH, EXTENDED RELEASE TRANSDERMAL at 08:08

## 2021-08-25 RX ADMIN — METHOCARBAMOL 1000 MG: 500 TABLET ORAL at 11:08

## 2021-08-25 RX ADMIN — TRAMADOL HYDROCHLORIDE 50 MG: 50 TABLET ORAL at 08:08

## 2021-08-25 RX ADMIN — HYDROXYZINE PAMOATE 50 MG: 50 CAPSULE ORAL at 08:08

## 2021-08-25 RX ADMIN — DIAZEPAM 5 MG: 5 TABLET ORAL at 02:08

## 2021-08-25 RX ADMIN — MIRTAZAPINE 30 MG: 30 TABLET, ORALLY DISINTEGRATING ORAL at 08:08

## 2021-08-25 RX ADMIN — METHOCARBAMOL 1000 MG: 500 TABLET ORAL at 08:08

## 2021-08-26 PROCEDURE — 11400000 HC PSYCH PRIVATE ROOM

## 2021-08-26 PROCEDURE — 25000003 PHARM REV CODE 250: Performed by: STUDENT IN AN ORGANIZED HEALTH CARE EDUCATION/TRAINING PROGRAM

## 2021-08-26 PROCEDURE — 99233 PR SUBSEQUENT HOSPITAL CARE,LEVL III: ICD-10-PCS | Mod: AF,HB,, | Performed by: STUDENT IN AN ORGANIZED HEALTH CARE EDUCATION/TRAINING PROGRAM

## 2021-08-26 PROCEDURE — 99233 SBSQ HOSP IP/OBS HIGH 50: CPT | Mod: AF,HB,, | Performed by: STUDENT IN AN ORGANIZED HEALTH CARE EDUCATION/TRAINING PROGRAM

## 2021-08-26 PROCEDURE — 25000003 PHARM REV CODE 250: Performed by: PSYCHIATRY & NEUROLOGY

## 2021-08-26 PROCEDURE — S4991 NICOTINE PATCH NONLEGEND: HCPCS | Performed by: PSYCHIATRY & NEUROLOGY

## 2021-08-26 RX ORDER — DIAZEPAM 5 MG/1
5 TABLET ORAL 2 TIMES DAILY
Status: DISCONTINUED | OUTPATIENT
Start: 2021-08-26 | End: 2021-08-27 | Stop reason: HOSPADM

## 2021-08-26 RX ORDER — TRAMADOL HYDROCHLORIDE 50 MG/1
50 TABLET ORAL 2 TIMES DAILY
Status: DISCONTINUED | OUTPATIENT
Start: 2021-08-26 | End: 2021-08-27 | Stop reason: HOSPADM

## 2021-08-26 RX ORDER — HYDROXYZINE PAMOATE 50 MG/1
50 CAPSULE ORAL NIGHTLY
Status: DISCONTINUED | OUTPATIENT
Start: 2021-08-26 | End: 2021-08-27 | Stop reason: HOSPADM

## 2021-08-26 RX ADMIN — MIRTAZAPINE 30 MG: 30 TABLET, ORALLY DISINTEGRATING ORAL at 08:08

## 2021-08-26 RX ADMIN — DIAZEPAM 5 MG: 5 TABLET ORAL at 08:08

## 2021-08-26 RX ADMIN — TRAMADOL HYDROCHLORIDE 50 MG: 50 TABLET ORAL at 08:08

## 2021-08-26 RX ADMIN — HYDROXYZINE PAMOATE 50 MG: 50 CAPSULE ORAL at 08:08

## 2021-08-26 RX ADMIN — NICOTINE 1 PATCH: 14 PATCH, EXTENDED RELEASE TRANSDERMAL at 04:08

## 2021-08-26 RX ADMIN — IBUPROFEN 600 MG: 600 TABLET ORAL at 11:08

## 2021-08-26 RX ADMIN — METHOCARBAMOL 1000 MG: 500 TABLET ORAL at 11:08

## 2021-08-27 VITALS
WEIGHT: 144.63 LBS | RESPIRATION RATE: 18 BRPM | HEART RATE: 57 BPM | TEMPERATURE: 98 F | DIASTOLIC BLOOD PRESSURE: 67 MMHG | HEIGHT: 70 IN | BODY MASS INDEX: 20.7 KG/M2 | OXYGEN SATURATION: 99 % | SYSTOLIC BLOOD PRESSURE: 112 MMHG

## 2021-08-27 DIAGNOSIS — F11.90 OPIOID USE DISORDER: Primary | ICD-10-CM

## 2021-08-27 PROCEDURE — 25000003 PHARM REV CODE 250: Performed by: STUDENT IN AN ORGANIZED HEALTH CARE EDUCATION/TRAINING PROGRAM

## 2021-08-27 PROCEDURE — 99239 HOSP IP/OBS DSCHRG MGMT >30: CPT | Mod: AF,HB,, | Performed by: STUDENT IN AN ORGANIZED HEALTH CARE EDUCATION/TRAINING PROGRAM

## 2021-08-27 PROCEDURE — S4991 NICOTINE PATCH NONLEGEND: HCPCS | Performed by: PSYCHIATRY & NEUROLOGY

## 2021-08-27 PROCEDURE — 25000003 PHARM REV CODE 250: Performed by: PSYCHIATRY & NEUROLOGY

## 2021-08-27 PROCEDURE — 99239 PR HOSPITAL DISCHARGE DAY,>30 MIN: ICD-10-PCS | Mod: AF,HB,, | Performed by: STUDENT IN AN ORGANIZED HEALTH CARE EDUCATION/TRAINING PROGRAM

## 2021-08-27 RX ORDER — NALTREXONE HYDROCHLORIDE 50 MG/1
50 TABLET, FILM COATED ORAL DAILY
Qty: 30 TABLET | Refills: 0 | Status: SHIPPED | OUTPATIENT
Start: 2021-08-27 | End: 2021-09-26

## 2021-08-27 RX ORDER — NALTREXONE HYDROCHLORIDE 50 MG/1
50 TABLET, FILM COATED ORAL DAILY
Qty: 30 TABLET | Refills: 0 | Status: SHIPPED | OUTPATIENT
Start: 2021-08-27 | End: 2021-08-27

## 2021-08-27 RX ORDER — HYDROXYZINE PAMOATE 50 MG/1
50 CAPSULE ORAL NIGHTLY
Qty: 30 CAPSULE | Refills: 0 | Status: ON HOLD | OUTPATIENT
Start: 2021-08-27 | End: 2024-02-26 | Stop reason: HOSPADM

## 2021-08-27 RX ORDER — MIRTAZAPINE 30 MG/1
30 TABLET, FILM COATED ORAL NIGHTLY
Qty: 30 TABLET | Refills: 0 | Status: ON HOLD | OUTPATIENT
Start: 2021-08-27 | End: 2022-05-03 | Stop reason: HOSPADM

## 2021-08-27 RX ADMIN — TRAMADOL HYDROCHLORIDE 50 MG: 50 TABLET ORAL at 08:08

## 2021-08-27 RX ADMIN — DIAZEPAM 5 MG: 5 TABLET ORAL at 08:08

## 2021-08-27 RX ADMIN — NICOTINE 1 PATCH: 14 PATCH, EXTENDED RELEASE TRANSDERMAL at 08:08

## 2022-04-29 PROCEDURE — 83036 HEMOGLOBIN GLYCOSYLATED A1C: CPT | Performed by: PSYCHIATRY & NEUROLOGY

## 2022-04-30 ENCOUNTER — HOSPITAL ENCOUNTER (INPATIENT)
Facility: HOSPITAL | Age: 32
LOS: 3 days | Discharge: HOME | DRG: 897 | End: 2022-05-03
Attending: PSYCHIATRY & NEUROLOGY | Admitting: PSYCHIATRY & NEUROLOGY
Payer: MEDICAID

## 2022-04-30 DIAGNOSIS — F19.10 SUBSTANCE ABUSE: ICD-10-CM

## 2022-04-30 DIAGNOSIS — F39 MOOD DISORDER: Primary | ICD-10-CM

## 2022-04-30 LAB
ESTIMATED AVG GLUCOSE: 103 MG/DL (ref 68–131)
HBA1C MFR BLD: 5.2 % (ref 4–5.6)

## 2022-04-30 PROCEDURE — 80061 LIPID PANEL: CPT | Performed by: PSYCHIATRY & NEUROLOGY

## 2022-04-30 PROCEDURE — 99223 PR INITIAL HOSPITAL CARE,LEVL III: ICD-10-PCS | Mod: AF,HB,, | Performed by: PSYCHIATRY & NEUROLOGY

## 2022-04-30 PROCEDURE — 99223 1ST HOSP IP/OBS HIGH 75: CPT | Mod: AF,HB,, | Performed by: PSYCHIATRY & NEUROLOGY

## 2022-04-30 PROCEDURE — 36415 COLL VENOUS BLD VENIPUNCTURE: CPT | Performed by: PSYCHIATRY & NEUROLOGY

## 2022-04-30 PROCEDURE — 99222 PR INITIAL HOSPITAL CARE,LEVL II: ICD-10-PCS | Mod: ,,, | Performed by: FAMILY MEDICINE

## 2022-04-30 PROCEDURE — S4991 NICOTINE PATCH NONLEGEND: HCPCS | Performed by: PSYCHIATRY & NEUROLOGY

## 2022-04-30 PROCEDURE — 25000003 PHARM REV CODE 250: Performed by: PSYCHIATRY & NEUROLOGY

## 2022-04-30 PROCEDURE — 99222 1ST HOSP IP/OBS MODERATE 55: CPT | Mod: ,,, | Performed by: FAMILY MEDICINE

## 2022-04-30 PROCEDURE — 11400000 HC PSYCH PRIVATE ROOM

## 2022-04-30 RX ORDER — HYDROXYZINE PAMOATE 50 MG/1
50 CAPSULE ORAL EVERY 6 HOURS PRN
Status: DISCONTINUED | OUTPATIENT
Start: 2022-04-30 | End: 2022-05-03 | Stop reason: HOSPADM

## 2022-04-30 RX ORDER — OLANZAPINE 10 MG/1
10 TABLET ORAL EVERY 4 HOURS PRN
Status: DISCONTINUED | OUTPATIENT
Start: 2022-04-30 | End: 2022-05-03 | Stop reason: HOSPADM

## 2022-04-30 RX ORDER — IBUPROFEN 400 MG/1
400 TABLET ORAL EVERY 6 HOURS PRN
Status: DISCONTINUED | OUTPATIENT
Start: 2022-04-30 | End: 2022-05-03 | Stop reason: HOSPADM

## 2022-04-30 RX ORDER — QUETIAPINE FUMARATE 25 MG/1
25 TABLET, FILM COATED ORAL EVERY 6 HOURS PRN
Status: DISCONTINUED | OUTPATIENT
Start: 2022-04-30 | End: 2022-05-03 | Stop reason: HOSPADM

## 2022-04-30 RX ORDER — CYCLOBENZAPRINE HCL 5 MG
5 TABLET ORAL EVERY 8 HOURS PRN
Status: DISCONTINUED | OUTPATIENT
Start: 2022-04-30 | End: 2022-05-03 | Stop reason: HOSPADM

## 2022-04-30 RX ORDER — PROMETHAZINE HYDROCHLORIDE 25 MG/1
25 TABLET ORAL EVERY 6 HOURS PRN
Status: DISCONTINUED | OUTPATIENT
Start: 2022-04-30 | End: 2022-05-03 | Stop reason: HOSPADM

## 2022-04-30 RX ORDER — FOLIC ACID 1 MG/1
1 TABLET ORAL DAILY
Status: DISCONTINUED | OUTPATIENT
Start: 2022-04-30 | End: 2022-05-03 | Stop reason: HOSPADM

## 2022-04-30 RX ORDER — CLONIDINE HYDROCHLORIDE 0.1 MG/1
0.1 TABLET ORAL EVERY 12 HOURS
Status: DISCONTINUED | OUTPATIENT
Start: 2022-04-30 | End: 2022-05-01

## 2022-04-30 RX ORDER — DICYCLOMINE HYDROCHLORIDE 10 MG/1
10 CAPSULE ORAL EVERY 6 HOURS PRN
Status: DISCONTINUED | OUTPATIENT
Start: 2022-04-30 | End: 2022-05-03 | Stop reason: HOSPADM

## 2022-04-30 RX ORDER — MIRTAZAPINE 15 MG/1
15 TABLET, FILM COATED ORAL NIGHTLY
Status: DISCONTINUED | OUTPATIENT
Start: 2022-04-30 | End: 2022-05-03 | Stop reason: HOSPADM

## 2022-04-30 RX ORDER — LOPERAMIDE HYDROCHLORIDE 2 MG/1
2 CAPSULE ORAL
Status: DISCONTINUED | OUTPATIENT
Start: 2022-04-30 | End: 2022-05-03 | Stop reason: HOSPADM

## 2022-04-30 RX ORDER — IBUPROFEN 200 MG
1 TABLET ORAL DAILY
Status: DISCONTINUED | OUTPATIENT
Start: 2022-04-30 | End: 2022-05-03 | Stop reason: HOSPADM

## 2022-04-30 RX ORDER — OLANZAPINE 10 MG/2ML
10 INJECTION, POWDER, FOR SOLUTION INTRAMUSCULAR EVERY 4 HOURS PRN
Status: DISCONTINUED | OUTPATIENT
Start: 2022-04-30 | End: 2022-05-03 | Stop reason: HOSPADM

## 2022-04-30 RX ORDER — MIRTAZAPINE 15 MG/1
15 TABLET, FILM COATED ORAL NIGHTLY PRN
Status: DISCONTINUED | OUTPATIENT
Start: 2022-04-30 | End: 2022-04-30

## 2022-04-30 RX ADMIN — IBUPROFEN 400 MG: 400 TABLET, FILM COATED ORAL at 10:04

## 2022-04-30 RX ADMIN — MIRTAZAPINE 15 MG: 15 TABLET, FILM COATED ORAL at 08:04

## 2022-04-30 RX ADMIN — CLONIDINE HYDROCHLORIDE 0.1 MG: 0.1 TABLET ORAL at 10:04

## 2022-04-30 RX ADMIN — FOLIC ACID 1 MG: 1 TABLET ORAL at 10:04

## 2022-04-30 RX ADMIN — NICOTINE 1 PATCH: 14 PATCH, EXTENDED RELEASE TRANSDERMAL at 10:04

## 2022-04-30 RX ADMIN — THERA TABS 1 TABLET: TAB at 10:04

## 2022-04-30 RX ADMIN — CYCLOBENZAPRINE HYDROCHLORIDE 5 MG: 5 TABLET, FILM COATED ORAL at 10:04

## 2022-04-30 RX ADMIN — CLONIDINE HYDROCHLORIDE 0.1 MG: 0.1 TABLET ORAL at 08:04

## 2022-04-30 NOTE — NURSING
Pt sleeping at this time, slept 6 hrs with 2 brief awakenings, not knowing where he was. NAD. Resp even and unlabored.Pathways clear,bed in low position. Q 15 min safety check ongoing.All precautions maintained.
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
## 2022-04-30 NOTE — CONSULTS
"[facility] Medicine  Consult Note    Patient Name: Ismael Gordon Jr.  MRN: [id number]  Admission Date: 4/30/2022  Hospital Length of Stay: 0 days  Attending Physician: Russ Chappell MD   Primary Care Provider: Primary Doctor No           Patient information was obtained from patient and ER records.     History & Physical      SUBJECTIVE:     History of Present Illness:  Patient is a 31 y.o. male presents with substance abuse. Admitted to [facility].    No past medical history other than psych. No complaints today.    PTA Medications   Medication Sig    hydrOXYzine pamoate (VISTARIL) 50 MG Cap Take 1 capsule (50 mg total) by mouth every evening.    mirtazapine (REMERON) 30 MG tablet Take 1 tablet (30 mg total) by mouth every evening.       Review of patient's allergies indicates:  No Known Allergies    Past Medical History:   Diagnosis Date    Bipolar 1 disorder     Depression     Hepatitis C     History of psychiatric hospitalization     "It has all been to get to previous 28 day programs."    Hx of psychiatric care     Psychiatric exam requested by authority     Psychiatric problem     Suicide attempt 01/16/2017    'I did an overdose on heroin so you could say passive suicide in my opinion."    Therapy      Past Surgical History:   Procedure Laterality Date    CHOLECYSTECTOMY       Family History   Problem Relation Age of Onset    Alcohol abuse Mother     No Known Problems Father     No Known Problems Sister     Drug abuse Brother     Anxiety disorder Maternal Aunt     Drug abuse Maternal Aunt     Drug abuse Paternal Aunt     Drug abuse Maternal Uncle     Anxiety disorder Maternal Uncle     Drug abuse Paternal Uncle     No Known Problems Maternal Grandfather     No Known Problems Maternal Grandmother     No Known Problems Paternal Grandfather     No Known Problems Paternal Grandmother     Drug abuse Cousin     Anxiety disorder Cousin     Alcohol abuse " "Cousin     No Known Problems Maternal Aunt     Drug abuse Maternal Aunt     No Known Problems Paternal Cousin      Social History     Tobacco Use    Smoking status: Current Every Day Smoker     Packs/day: 0.25     Years: 7.00     Pack years: 1.75     Types: Cigarettes     Start date: 2009    Smokeless tobacco: Current User    Tobacco comment: "By default I have quit because I can no longer afford it."    Substance Use Topics    Alcohol use: No    Drug use: Yes     Types: Methamphetamines, Marijuana     Comment: Feb 2015 heroin "but very rarely but when I do I blow out."        Review of Systems:  Constitutional: no fever or chills  Respiratory: no cough or shorness of breath  Cardiovascular: no chest pain or palpitations  Gastrointestinal: no nausea or vomiting, no abdominal pain or change in bowel habits  Musculoskeletal: no arthralgias or myalgias  Psych: agitated  OBJECTIVE:     Vital Signs (Most Recent)  Temp: 97.2 °F (36.2 °C) (04/30/22 0800)  Pulse: 88 (04/30/22 0800)  Resp: 18 (04/30/22 0800)  BP: 122/68 (04/30/22 0800)    Physical Exam:  General: well developed, well nourished  Lungs:  clear to auscultation bilaterally and normal respiratory effort  Cardiovascular: Heart: regular rate and rhythm, S1, S2 normal, no murmur, click, rub or gallop. Chest Wall: no tenderness. Extremities: no cyanosis or edema, or clubbing. Pulses: 2+ and symmetric.  Abdomen/Rectal: Abdomen: soft, non-tender non-distented; bowel sounds normal; no masses,  no organomegaly. Rectal: not examined  Skin: Skin color, texture, turgor normal. No rashes or lesions  Musculoskeletal:normal gait  Psych: agitated  Neuro: Cranial nerves:  CN II Visual fields full to confrontation.   CN III, IV, VI Pupils are equal, round, and reactive to light.  CN III: no palsy  Nystagmus: none   Diplopia: none  Ophthalmoparesis: none  CN V Facial sensation intact.   CN VII Facial expression full, symmetric.   CN VIII normal.   CN IX normal.   CN X " normal.   CN XI normal.   CN XII normal.        Laboratory  CBC:   Recent Labs   Lab 04/29/22 2109   WBC 8.63   RBC 4.65   HGB 13.0*   HCT 39.9*      MCV 86   MCH 28.0   MCHC 32.6     CMP:   Recent Labs   Lab 04/29/22 2109      CALCIUM 9.5   ALBUMIN 4.3   PROT 7.6      K 3.5   CO2 26      BUN 27*   CREATININE 1.0   ALKPHOS 73   ALT 22   AST 19   BILITOT 0.6     Recent Labs   Lab 04/29/22 2107   COLORU Yellow   SPECGRAV >=1.030*   PHUR 6.0   PROTEINUA Negative   NITRITE Negative   LEUKOCYTESUR Negative   UROBILINOGEN Negative     TSH   Date Value Ref Range Status   04/29/2022 1.917 0.400 - 4.000 uIU/mL Final     No results found for this or any previous visit.  Alcohol, Serum   Date Value Ref Range Status   04/29/2022 <10 <10 mg/dL Final     Acetaminophen (Tylenol), Serum   Date Value Ref Range Status   04/29/2022 <3.0 (L) 10.0 - 20.0 ug/mL Final     Comment:     Toxic Levels:  Adults (4 hr post-ingestion).........>150 ug/mL  Adults (12 hr post-ingestion)........>40 ug/mL  Peds (2 hr post-ingestion, liquid)...>225 ug/mL       Results for orders placed or performed during the hospital encounter of 04/29/22   Salicylate Level   Result Value Ref Range    Salicylate Lvl <5.0 (L) 15.0 - 30.0 mg/dL     Results for orders placed or performed during the hospital encounter of 04/29/22   Drug screen panel, in-house   Result Value Ref Range    Benzodiazepines Negative Negative    Methadone metabolites Negative Negative    Cocaine (Metab.) Negative Negative    Opiate Scrn, Ur Presumptive Positive (A) Negative    Barbiturate Screen, Ur Negative Negative    Amphetamine Screen, Ur Presumptive Positive (A) Negative    THC Negative Negative    Phencyclidine Negative Negative    Creatinine, Urine 355.3 23.0 - 375.0 mg/dL    Toxicology Information SEE COMMENT      No results found for: PREGTESTUR    ASSESSMENT/PLAN:     Active Hospital Problems    Diagnosis  POA    Methamphetamine abuse [F15.10]  Yes       Resolved Hospital Problems   No resolved problems to display.       Plan: Further orders for psych      Assessment/Plan:     Methamphetamine abuse  Says he is here to detox. Orders per psyche.        VTE Risk Mitigation (From admission, onward)    None              Thank you for your consult. I will sign off. Please contact us if you have any additional questions.    Lana Kelsey MD  Department of Hospital Medicine   [facility]

## 2022-04-30 NOTE — NURSING
Received report from PUJA Odom at [facility] for long term drug addition.  Patient threw meal tray in ER and was given IM Benadryl 50, Haldol 5mg and Ativan 2mg. Patient arrived on unit  4/30/22 at 0106am via wheelchair, skin assessment completed , no contraband found. Patient is sleepy and unable to sign paperwork or answer any questions. Will continue to monitor patient.

## 2022-04-30 NOTE — PLAN OF CARE
Pt taking scheduled medications w/out difficulty;  Seizure /  Fall  prevention in place;  MVC Monitored per policy. Denies SI / HI; No C/O pain  No sleep disturbances as reported by PM shift.   Upon morning assessment patient was    laying in bed         awake     tense    quiet       accepting of care     Denies A/VH. No RIS observed.         Contracted for safety.      Avoids social contact, No Interaction with peers noted.  (Mood/Behavior/Emotion): easily agitated angry anxious  flat hostile  hypoactive labile  withdrawn   anxious guarded labile depressed tense distrustful  linear thoughts  Ate meals/snack.     Continue POC.

## 2022-04-30 NOTE — NURSING
Patient arrived to [facility] from [facility] ER, via wheelchair, escorted by hospital security and U RN. Patient is lethargic and unable to stand at this time, 2/2 medications, WANDED, no contraband found, taken to room and assisted into bed,

## 2022-04-30 NOTE — NURSING
Patient withdrawn, isolative, ( out of bed only for meals ).  Irritable, labile, & angry, easily agitated. Affect flat,thoughts linear speech abrupt & sparse.  Appears flat, restricted & bizarre, appears unclean, unkempt, unshaven & disheveled. Denies SI / HI & A/V hallucinations.  Monitor q 15 minutes per protocol.

## 2022-05-01 LAB
CHOLEST SERPL-MCNC: 148 MG/DL (ref 120–199)
CHOLEST/HDLC SERPL: 3 {RATIO} (ref 2–5)
HDLC SERPL-MCNC: 50 MG/DL (ref 40–75)
HDLC SERPL: 33.8 % (ref 20–50)
LDLC SERPL CALC-MCNC: 88.8 MG/DL (ref 63–159)
NONHDLC SERPL-MCNC: 98 MG/DL
TRIGL SERPL-MCNC: 46 MG/DL (ref 30–150)

## 2022-05-01 PROCEDURE — 99233 PR SUBSEQUENT HOSPITAL CARE,LEVL III: ICD-10-PCS | Mod: AF,HB,, | Performed by: PSYCHIATRY & NEUROLOGY

## 2022-05-01 PROCEDURE — 36415 COLL VENOUS BLD VENIPUNCTURE: CPT | Performed by: PSYCHIATRY & NEUROLOGY

## 2022-05-01 PROCEDURE — S4991 NICOTINE PATCH NONLEGEND: HCPCS | Performed by: PSYCHIATRY & NEUROLOGY

## 2022-05-01 PROCEDURE — 99233 SBSQ HOSP IP/OBS HIGH 50: CPT | Mod: AF,HB,, | Performed by: PSYCHIATRY & NEUROLOGY

## 2022-05-01 PROCEDURE — 25000003 PHARM REV CODE 250: Performed by: PSYCHIATRY & NEUROLOGY

## 2022-05-01 PROCEDURE — 11400000 HC PSYCH PRIVATE ROOM

## 2022-05-01 RX ORDER — CLONIDINE HYDROCHLORIDE 0.1 MG/1
0.1 TABLET ORAL 3 TIMES DAILY
Status: DISCONTINUED | OUTPATIENT
Start: 2022-05-01 | End: 2022-05-02

## 2022-05-01 RX ADMIN — CLONIDINE HYDROCHLORIDE 0.1 MG: 0.1 TABLET ORAL at 08:05

## 2022-05-01 RX ADMIN — HYDROXYZINE PAMOATE 50 MG: 50 CAPSULE ORAL at 08:05

## 2022-05-01 RX ADMIN — FOLIC ACID 1 MG: 1 TABLET ORAL at 08:05

## 2022-05-01 RX ADMIN — MIRTAZAPINE 15 MG: 15 TABLET, FILM COATED ORAL at 08:05

## 2022-05-01 RX ADMIN — THERA TABS 1 TABLET: TAB at 08:05

## 2022-05-01 RX ADMIN — NICOTINE 1 PATCH: 14 PATCH, EXTENDED RELEASE TRANSDERMAL at 08:05

## 2022-05-01 RX ADMIN — CLONIDINE HYDROCHLORIDE 0.1 MG: 0.1 TABLET ORAL at 03:05

## 2022-05-01 NOTE — NURSING
Patient withdrawn, isolative, & restless,observed in the milieu with minimal interactions with  Peers. Affect flat,thoughts linear, speech effective & sparse.  Appears flat,& restricted,  appears clean & appropriate. Denies SI / HI & A/V hallucinations.  Monitor q 15 minutes per protocol

## 2022-05-01 NOTE — PLAN OF CARE
Plan of care reviewed. Denies intent to harm self or others at this time. Accepts snacks and medications. Gait steady, no falls. Limited interactions noted with staff and peers. Promoted individualized safety plan, reality-based interactions, effective coping strategies, and impulse control. Will continue precautions and monitor for safety

## 2022-05-01 NOTE — PROGRESS NOTES
"PSYCHIATRY DAILY INPATIENT PROGRESS NOTE  SUBSEQUENT HOSPITAL VISIT    ENCOUNTER DATE: 5/1/2022  SITE: [facility]    DATE OF ADMISSION: 4/30/2022  1:06 AM  LENGTH OF STAY: 1 days      CHIEF COMPLAINT   Ismael Gordon Jr. is a 31 y.o. male, seen during daily white rounds on the inpatient unit.  Ismael Gordon Jr. presented with the chief complaint of substance problems      The patient was seen and examined. The chart was reviewed.     Reviewed notes from Rns, MD and SW and labs from the last 24 hours.    The patient's case was discussed with the treatment team/care providers today including Rns and SW    Staff reports no behavioral or management issues.     The patient has been compliant with treatment.      Subjective 05/01/2022       Today the patient reports he is doing alright. States he is "dealing with the withdrawal." Still having symptoms including restlessness in his legs, stomach cramping, anxiety. States he had a hard time sleeping last night due to his withdrawal symptoms.     Pt denies issues with mood at this time. States he is otherwise feeling alright. He again is wanting to make sure he will be out of the hospital prior to next weekend to avoid exceeding his vacation time from work.     Endorses anxiety at baseline but states it is worse due to withdrawal.     Encouraged pt to make use of PRN medication for withdrawal symptoms not addressed by clonidine. Endorses understanding.       The patient denies any side effects to medications.          Psychiatric ROS (observed, reported, or endorsed/denied):  Depressed mood - denies  Interest/pleasure/anhedonia: denies  Guilt/hopelessness/worthlessness - denies  Changes in Sleep - denies  Changes in Appetite - denies  Changes in Concentration - denies  Changes in Energy - denies  PMA/R- denies  Suicidal- active/passive ideations - denies  Homicidal ideations: active/passive ideations - denies    Hallucinations - denies  Delusions - " denies  Disorganized behavior - denies  Disorganized speech - denies  Negative symptoms - denies    Elevated mood - denies  Decreased need for sleep - denies  Grandiosity - denies  Racing thoughts - denies  Impulsivity - denies  Irritability- denies  Increased energy - denies  Distractibility - denies  Increase in goal-directed activity or PMA- denies    Symptoms of KAILYN - denies  Symptoms of Panic Disorder- denies  Symptoms of PTSD - denies        Overall progress: Patient is showing mild improvement         Psychotherapy:  · Target symptoms: substance abuse  · Why chosen therapy is appropriate versus another modality: relevant to diagnosis  · Outcome monitoring methods: self-report, lab data, observation  · Therapeutic intervention type: insight oriented psychotherapy  · Topics discussed/themes: building skills sets for symptom management, symptom recognition  · The patient's response to the intervention is guarded. The patient's progress toward treatment goals is fair.   · Duration of intervention: 16 minutes.        Medical ROS  Review of Systems   Constitutional: Negative for chills and fever.   HENT: Negative for congestion, hearing loss, sore throat and tinnitus.    Eyes: Negative for blurred vision, double vision, photophobia and pain.   Respiratory: Negative for cough, hemoptysis, sputum production, shortness of breath and wheezing.    Cardiovascular: Negative for chest pain, palpitations and leg swelling.   Gastrointestinal: Positive for abdominal pain. Negative for blood in stool, constipation, diarrhea, nausea and vomiting.   Genitourinary: Negative for dysuria, frequency, hematuria and urgency.   Musculoskeletal: Positive for myalgias. Negative for back pain and joint pain.   Skin: Negative for rash.   Neurological: Negative for dizziness, tremors, seizures, weakness and headaches.   Psychiatric/Behavioral: The patient is nervous/anxious and has insomnia.          PAST MEDICAL HISTORY   Past Medical  "History:   Diagnosis Date    Bipolar 1 disorder     Depression     Hepatitis C     History of psychiatric hospitalization     "It has all been to get to previous 28 day programs."    Hx of psychiatric care     Psychiatric exam requested by authority     Psychiatric problem     Suicide attempt 01/16/2017    'I did an overdose on heroin so you could say passive suicide in my opinion."    Therapy            PSYCHOTROPIC MEDICATIONS   Scheduled Meds:   cloNIDine  0.1 mg Oral Q12H    folic acid  1 mg Oral Daily    mirtazapine  15 mg Oral QHS    multivitamin  1 tablet Oral Daily    nicotine  1 patch Transdermal Daily     Continuous Infusions:  PRN Meds:.cyclobenzaprine, dicyclomine, hydrOXYzine pamoate, ibuprofen, loperamide, OLANZapine **AND** OLANZapine, promethazine, QUEtiapine        EXAMINATION    VITALS   Vitals:    04/30/22 0800 04/30/22 2017 05/01/22 0700 05/01/22 0800   BP: 122/68 106/64 124/78    BP Location: Right leg Left arm Left arm    Patient Position: Sitting Lying Sitting    Pulse: 88 67 86    Resp: 18 18 18    Temp: 97.2 °F (36.2 °C) 98 °F (36.7 °C) 97.5 °F (36.4 °C)    TempSrc: Temporal Temporal Temporal    Weight:    66.3 kg (146 lb 0.9 oz)       Body mass index is 20.96 kg/m².        CONSTITUTIONAL  General Appearance: unremarkable, age appropriate    MUSCULOSKELETAL  Muscle Strength and Tone:no tremor, no tic  Abnormal Involuntary Movements: No  Gait and Station: non-ataxic    PSYCHIATRIC   Level of Consciousness: awake and alert   Orientation: person, place and situation  Grooming: Casually dressed and Well groomed  Psychomotor Behavior: normal, cooperative  Speech: normal tone, normal rate, normal pitch, normal volume  Language: grossly intact  Mood: fine  Affect: Consistent with mood  Thought Process: linear, logical  Associations: intact   Thought Content: DENIES suicidal ideation, DENIES homicidal ideation and no delusions  Perceptions: denies hallucinations  Memory: Able to recall " past events, Remote intact and Recent intact  Attention:Attends to interview without distraction  Fund of Knowledge: Aware of current events and Vocabulary appropriate   Estimate if Intelligence:  Average based on work/education history, vocabulary and mental status exam  Insight: has awareness of illness  Judgment: behavior is adequate to circumstances        DIAGNOSTIC TESTING   Laboratory Results  Recent Results (from the past 24 hour(s))   Lipid Panel    Collection Time: 04/30/22  9:03 PM   Result Value Ref Range    Cholesterol 148 120 - 199 mg/dL    Triglycerides 46 30 - 150 mg/dL    HDL 50 40 - 75 mg/dL    LDL Cholesterol 88.8 63.0 - 159.0 mg/dL    HDL/Cholesterol Ratio 33.8 20.0 - 50.0 %    Total Cholesterol/HDL Ratio 3.0 2.0 - 5.0    Non-HDL Cholesterol 98 mg/dL             MEDICAL DECISION MAKING      ASSESSMENT:   SUBSTANCE-RELATED DISORDERS; Amphetamine Related Disorders; Amphetamine Abuse  and Opioid-Related Disorders; Opioid Dependence With Physiological Dependence  and ANXIETY DISORDERS; 7.11.Anxiety Disorder NOS (F41.9)         PROBLEM LIST AND MANAGEMENT PLANS    #Opioid Dependence  - Clonidine 0.1mg PO BID --> Titrate to TID  - Flexaril 5mg PO q8h PRN Mm spasm  - Bentyl 10mg PO q6h PRN stomach cramping  - Loperaminde 2mg PO PRN diarrhea  - Phenergan 25mg PO q6h PRN n/v  - Seroquel 25mg PO q6h PRN anxiety  - Remeron 15mg PO QHS for insomnia     # Unspecified Anxiety  - Remeron as above  - Seroquel PRN as above     #Amphetamine Abuse  - Pt counseled            Discussed diagnosis, risks and benefits of proposed treatment vs alternative treatments vs no treatment, potential side effects of these treatments and the inherent unpredictability of treatment. The patient expresses understanding of the above and displays the capacity to agree with this treatment given said understanding. Patient also agrees that, currently, the benefits outweigh the risks and would like to pursue/continue treatment at this  time.      DISCHARGE PLANNING  Expected Disposition Plan: Home or Self Care      NEED FOR CONTINUED HOSPITALIZATION  Psychiatric illness continues to pose a potential threat to life or bodily function, of self or others, thereby requiring the need for continued inpatient psychiatric hospitalization: Yes, due to: continued opiate withdrawal and risk of relapse/overdose if pt discharged in withdrawal, as evidenced by:  Ongoing concerns with grave disability with patient unable to perform basic feeding, hygiene and dressing activities without significant constant support.    Protective inpatient pyschiatric hospitalization required while a safe disposition plan is enacted: Yes    Patient stabilized and ready for discharge from inpatient psychiatric unit: No        STAFF:   Julio Chappell MD  Psychiatry

## 2022-05-01 NOTE — NURSING
Pt sleeping at this time, slept 4 hrs with 1 awakenings. NAD. Resp even and unlabored.Pathways clear,bed in low position. Q 15 min safety check ongoing.All precautions maintained.

## 2022-05-01 NOTE — PLAN OF CARE
Pt taking scheduled medications w/out difficulty;  Seizure /  Fall  prevention in place;  MVC Monitored per policy. Denies SI / HI; No C/O pain   Poor sleep reported by PM shift.   Upon morning assessment patient was   awake   calm      quiet      accepting of care    Denies A/VH. No RIS observed.   Contracted for safety.   Avoids social contact,  No Interaction with peers noted.  (Mood/Behavior/Emotion):   flat  hypoactive labile  withdrawn calm  anxious guarded labile depressed  distrustful  thoughts linear  Ate meals/snack.      Continue POC.

## 2022-05-02 PROBLEM — F39 MOOD DISORDER: Status: ACTIVE | Noted: 2022-05-02

## 2022-05-02 PROCEDURE — 99233 SBSQ HOSP IP/OBS HIGH 50: CPT | Mod: AF,HB,, | Performed by: PSYCHIATRY & NEUROLOGY

## 2022-05-02 PROCEDURE — 90833 PR PSYCHOTHERAPY W/PATIENT W/E&M, 30 MIN (ADD ON): ICD-10-PCS | Mod: AF,HB,, | Performed by: PSYCHIATRY & NEUROLOGY

## 2022-05-02 PROCEDURE — 25000003 PHARM REV CODE 250: Performed by: PSYCHIATRY & NEUROLOGY

## 2022-05-02 PROCEDURE — 11400000 HC PSYCH PRIVATE ROOM

## 2022-05-02 PROCEDURE — S4991 NICOTINE PATCH NONLEGEND: HCPCS | Performed by: PSYCHIATRY & NEUROLOGY

## 2022-05-02 PROCEDURE — 99233 PR SUBSEQUENT HOSPITAL CARE,LEVL III: ICD-10-PCS | Mod: AF,HB,, | Performed by: PSYCHIATRY & NEUROLOGY

## 2022-05-02 PROCEDURE — 90833 PSYTX W PT W E/M 30 MIN: CPT | Mod: AF,HB,, | Performed by: PSYCHIATRY & NEUROLOGY

## 2022-05-02 RX ADMIN — MIRTAZAPINE 15 MG: 15 TABLET, FILM COATED ORAL at 08:05

## 2022-05-02 RX ADMIN — NICOTINE 1 PATCH: 14 PATCH, EXTENDED RELEASE TRANSDERMAL at 08:05

## 2022-05-02 RX ADMIN — THERA TABS 1 TABLET: TAB at 08:05

## 2022-05-02 RX ADMIN — FOLIC ACID 1 MG: 1 TABLET ORAL at 08:05

## 2022-05-02 NOTE — PLAN OF CARE
Patient guarded, quiet, and withdrawn; unkempt.  Denies intentions to harm self and/or others at this time. Denies auditory and/or visual hallucinations.  Affect and emotion blunted, irritable, and tense.  Interacts appropriately with peers and staff.  Accepts meals and medications.  Discussed medication and plan of care; patient needs continued education and reinforcement.

## 2022-05-02 NOTE — PLAN OF CARE
Pt in bed all shift except for snacks and meds.  Blunted, guarded, cooperative.  Appears irritated.  Vistaril po prn given.  Denies SI/HI/hallucinations.  No distress noted.  Will continue to monitor for safety.

## 2022-05-02 NOTE — PROGRESS NOTES
"   05/02/22 1517   Assessment   Patient's Identification of the Problem Patient presents calm, cooperative reports "good" mood. Patient states his admit is due to "detox." Patient admits to negative leisure lifestyle of cigarettes,heroin. Patient reports he is single, no children,has GED, employed(does  work), lives in [place] in a rent house. Patient verbalized his main goal is to stay clean, change environments and move.   Leisure Interest Enjoy Family/Friends;Listen to Music;Fishing  (nothing much lately per patient)   Leisure Barriers Loss of Interest;Lack of Finances;Drug Use   Treatment Focus To Improve Mood;To Improve Coping Skills;To Improve Leisure Awareness/Lifestyle/Interest;To Educate and Increase Awareness of Sober Free Activities     Treatment Recommendation:   1:1 Intervention (as needed)    Cognitive Stimulation Skilled Activity  Mild Exercises Skilled Activity  Coping Skilled Activity  Leisure Education and Awareness Skilled Activity    Treatment Goal(s):  Long Term Goals Refer To Master Treatment Plan    Short Term Treatment Goal(s)  Patient Will:  Comply with Treatment  Exhibit Improvement in Mood  Verbalize Improvement in Mood  Identify (+) Leisure / Recreation Activities that Promote Wellness and Promote a Sober Lifestyle    Discharge Recommendations:  Encourage Patient to Actively Utilize Available Community Resources to Increase Leisure Involvement to Decrease Signs and Symptoms of Illness  Encourage Patient to Utilize Coping Skills on a Regular Basis to Reduce the Risk of Decompensating and Re-Hospitalizations  AA/NA Meetings  Follow Up with After Care Appointments  "
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
## 2022-05-02 NOTE — PROGRESS NOTES
"PSYCHIATRY DAILY INPATIENT PROGRESS NOTE  SUBSEQUENT HOSPITAL VISIT    ENCOUNTER DATE: 5/2/2022  SITE: [facility]    DATE OF ADMISSION: 4/30/2022  1:06 AM  LENGTH OF STAY: 2 days      CHIEF COMPLAINT   Ismael Gordon Jr. is a 31 y.o. male, seen during daily white rounds on the inpatient unit.  Ismael Gordon Jr. presented with the chief complaint of substance problems      The patient was seen and examined. The chart was reviewed.     Reviewed notes from Rns, MD and SW and labs from the last 24 hours.    The patient's case was discussed with the treatment team/care providers today including Rns and SW    Staff reports no behavioral or management issues.     The patient has been compliant with treatment.      Subjective 05/02/2022    Yesterday, the following was reported: the patient reports he is doing alright. States he is "dealing with the withdrawal." Still having symptoms including restlessness in his legs, stomach cramping, anxiety. States he had a hard time sleeping last night due to his withdrawal symptoms.   Pt denies issues with mood at this time. States he is otherwise feeling alright. He again is wanting to make sure he will be out of the hospital prior to next weekend to avoid exceeding his vacation time from work.   Endorses anxiety at baseline but states it is worse due to withdrawal.   Encouraged pt to make use of PRN medication for withdrawal symptoms not addressed by clonidine. Endorses understanding.     Today, the patient reports that he is making improvements. He is focused on discharge as he was served an eviction notice and needs to obtain his belongings. He plans to move to [place] later this week with his girlfriend and "start fresh." He denied all symptoms- there is concern that he may be minimizing symptoms.   -he reports improved withdrawals.     The patient denies any side effects to medications.          Psychiatric ROS (observed, reported, or endorsed/denied):  Depressed mood " - denies  Interest/pleasure/anhedonia: denies  Guilt/hopelessness/worthlessness - denies  Changes in Sleep - denies  Changes in Appetite - denies  Changes in Concentration - denies  Changes in Energy - denies  PMA/R- denies  Suicidal- active/passive ideations - denies  Homicidal ideations: active/passive ideations - denies    Hallucinations - denies  Delusions - denies  Disorganized behavior - denies  Disorganized speech - denies  Negative symptoms - denies    Elevated mood - denies  Decreased need for sleep - denies  Grandiosity - denies  Racing thoughts - denies  Impulsivity - denies  Irritability- denies  Increased energy - denies  Distractibility - denies  Increase in goal-directed activity or PMA- denies    Symptoms of KAILYN - denies  Symptoms of Panic Disorder- denies  Symptoms of PTSD - denies        Overall progress: Patient is showing moderate improvement        Psychotherapy:  · Target symptoms: substance abuse  · Why chosen therapy is appropriate versus another modality: relevant to diagnosis  · Outcome monitoring methods: self-report, lab data, observation  · Therapeutic intervention type: insight oriented psychotherapy  · Topics discussed/themes: building skills sets for symptom management, symptom recognition  · The patient's response to the intervention is accepting. The patient's progress toward treatment goals is fair.   · Duration of intervention: 16 minutes.        Medical ROS  Review of Systems   Constitutional: Negative for chills and fever.   HENT: Negative for congestion, hearing loss, sore throat and tinnitus.    Eyes: Negative for blurred vision, double vision, photophobia and pain.   Respiratory: Negative for cough, hemoptysis, sputum production, shortness of breath and wheezing.    Cardiovascular: Negative for chest pain, palpitations and leg swelling.   Gastrointestinal: Negative for abdominal pain, blood in stool, constipation, diarrhea, nausea and vomiting.   Genitourinary: Negative for  "dysuria, frequency, hematuria and urgency.   Musculoskeletal: Negative for back pain, joint pain and myalgias.   Skin: Negative for rash.   Neurological: Negative for dizziness, tremors, seizures, weakness and headaches.   Psychiatric/Behavioral:        See HPI         PAST MEDICAL HISTORY   Past Medical History:   Diagnosis Date    Bipolar 1 disorder     Depression     Hepatitis C     History of psychiatric hospitalization     "It has all been to get to previous 28 day programs."    Hx of psychiatric care     Psychiatric exam requested by authority     Psychiatric problem     Suicide attempt 01/16/2017    'I did an overdose on heroin so you could say passive suicide in my opinion."    Therapy            PSYCHOTROPIC MEDICATIONS   Scheduled Meds:   cloNIDine  0.1 mg Oral TID    folic acid  1 mg Oral Daily    mirtazapine  15 mg Oral QHS    multivitamin  1 tablet Oral Daily    nicotine  1 patch Transdermal Daily     Continuous Infusions:  PRN Meds:.cyclobenzaprine, dicyclomine, hydrOXYzine pamoate, ibuprofen, loperamide, OLANZapine **AND** OLANZapine, promethazine, QUEtiapine        EXAMINATION    VITALS   Vitals:    05/01/22 1530 05/01/22 1950 05/02/22 0743 05/02/22 1238   BP: 114/62 116/65 111/69 121/75   BP Location: Left arm Right arm Right arm Left arm   Patient Position: Sitting Sitting Lying Sitting   Pulse: 67 66 (!) 54 73   Resp: 18 18 18 18   Temp: 98 °F (36.7 °C) 98.5 °F (36.9 °C) 98.4 °F (36.9 °C) 97.9 °F (36.6 °C)   TempSrc: Temporal Temporal Temporal Temporal   Weight:           Body mass index is 20.96 kg/m².        CONSTITUTIONAL  General Appearance: unremarkable, age appropriate    MUSCULOSKELETAL  Muscle Strength and Tone:no tremor, no tic  Abnormal Involuntary Movements: No  Gait and Station: non-ataxic    PSYCHIATRIC   Level of Consciousness: awake and alert   Orientation: person, place and situation  Grooming: Casually dressed and Well groomed  Psychomotor Behavior: normal, " cooperative  Speech: normal tone, normal rate, normal pitch, normal volume  Language: grossly intact  Mood: fine  Affect: Consistent with mood  Thought Process: linear, logical  Associations: intact   Thought Content: DENIES suicidal ideation, DENIES homicidal ideation and no delusions  Perceptions: denies hallucinations  Memory: Able to recall past events, Remote intact and Recent intact  Attention:Attends to interview without distraction  Fund of Knowledge: Aware of current events and Vocabulary appropriate   Estimate if Intelligence:  Average based on work/education history, vocabulary and mental status exam  Insight: has awareness of illness  Judgment: behavior is adequate to circumstances        DIAGNOSTIC TESTING   Laboratory Results  No results found for this or any previous visit (from the past 24 hour(s)).          MEDICAL DECISION MAKING      ASSESSMENT:   Bipolar I Disorder mre depressed, severe without psychotic features  R/o SIMD  Unspecified Anxiety Disorder     Psychosocial stressors     Polysubstance Dependence (benzos, cannabis, methamphetamines, and opiates; severe, continuous with physiological dependence)  Nicotine Dependence        PROBLEM LIST AND MANAGEMENT PLANS    Depression: pt counseled  -unsure if primary (Bipolar vs MDD) vs SIMD  -started/continue Remeron 15 mg po q HS     Anxiety: pt counseled  -unsure if primary (Bipolar vs MDD) vs SIAD  -remeron as above  -vistaril prn     Psychosocial stressors: pt counseled  SW consulted and assisting with resources     Polysubstance Dependence:  pt counseled  -rehab once stable  continue Clonidine 0.1 mg po TID  -  Nicotine Dependence:  pt counseled  -start nicotine 14 mg patch dermal q day             Discussed diagnosis, risks and benefits of proposed treatment vs alternative treatments vs no treatment, potential side effects of these treatments and the inherent unpredictability of treatment. The patient expresses understanding of the above and  displays the capacity to agree with this treatment given said understanding. Patient also agrees that, currently, the benefits outweigh the risks and would like to pursue/continue treatment at this time.      DISCHARGE PLANNING  Expected Disposition Plan: Home or Self Care- the patient is improving and will likely be stable for discharge tomorrow       NEED FOR CONTINUED HOSPITALIZATION  Psychiatric illness continues to pose a potential threat to life or bodily function, of self or others, thereby requiring the need for continued inpatient psychiatric hospitalization: Yes, due to: continued opiate withdrawal and risk of relapse/overdose if pt discharged in withdrawal, as evidenced by:  Ongoing concerns with grave disability with patient unable to perform basic feeding, hygiene and dressing activities without significant constant support.    Protective inpatient pyschiatric hospitalization required while a safe disposition plan is enacted: Yes    Patient stabilized and ready for discharge from inpatient psychiatric unit: No        STAFF:   Anshul Abraham MD  Psychiatry

## 2022-05-02 NOTE — PSYCH
[facility] discussed with pt where will he go upon discharge. Pt states he will move to [place] with his girlfriend. Pt states that his mother told him he has had an eviction notice and he is worried about losing all of his things. [facility] discussed the eviction notice with his girlfriend Candie Urrutia [phone]. Candie states that he will not lose all of his things and all his things are packed up and ready to go and if need be his mother will go and  all his things.This is just him being paranoid and it takes more than just three days to detox. Candie states she spoke to his mother and she did not mention he had an eviction notice. One time he paid half his rent because he did not halve all the money to pay his landlord and ended up paying her the rest of it in the middlle of the month. He has never spoken nor never met his landlord. He just send a check and deposits it into the [employer] account. His landlord is rude and he goes through this realtor when he has problems with the home he rents.Candie states that pt is jut being paranoid and worried for nothing, but when he does get up there she will make sure if he relapses he will go to a rehab.

## 2022-05-02 NOTE — CONSULTS
[facility] - Behavioral Health  Adult Nutrition  Consult Note    SUMMARY     Recommendations    Recommendation:   1. Regular diet appropriate   2. Appetite stimulant per MD   3. Encourage adequate intake of meals as needed   4. Boost Plus BID for intake support    Goals: pt to meet at least 75% EEN by f/u  Nutrition Goal Status: new  Communication of RD Recs:  (POC)    Assessment and Plan  Nutrition Problem:  Inadequate energy intake    Related to (etiology):   Withdrawal    Signs and Symptoms (as evidenced by):   Pt consuming 25-50% of meals per flowsheets    Interventions:  General Healthful Diet    Nutrition Diagnosis Status:   New     Reason for Assessment    Reason For Assessment: consult  Diagnosis: psychological disorder  Relevant Medical History: bipolar 1 disorder, depression, hep c, psych problem    General Information Comments: Pt consuming 25-50% of meals and 100% of snacks. No sign of significant wt changes at this time. +drug addiction. NFPE not completed per psych status.    Nutrition Discharge Planning: Regular diet    Nutrition Risk Screen    Nutrition Risk Screen: no indicators present    Nutrition/Diet History    Food Allergies: NKFA    Anthropometrics    Temp: 98.4 °F (36.9 °C)  Weight: 66.3 kg (146 lb 0.9 oz)  Weight (lb): 146.06 lb  BMI Grade: 18.5-24.9 - normal       Lab/Procedures/Meds    Pertinent Labs Reviewed: reviewed  Pertinent Labs Comments: BUN 27  Pertinent Medications Reviewed: reviewed  Pertinent Medications Comments: folic acid, remeron, MVI    Estimated/Assessed Needs    Weight Used For Calorie Calculations: 66.3 kg (146 lb 2.6 oz)  Energy Calorie Requirements (kcal): 1989-2320  Energy Need Method: Kcal/kg (30-35)  Protein Requirements: 53-66 g (.8-1 g/kg)  Weight Used For Protein Calculations: 66.3 kg (146 lb 2.6 oz)     Estimated Fluid Requirement Method: RDA Method  RDA Method (mL): 1989       Nutrition Prescription Ordered    Current Diet Order: Regular diet    Evaluation of  Received Nutrient/Fluid Intake    Fluid Required: meeting needs  % Intake of Estimated Energy Needs: 25 - 50 %  % Meal Intake: 25 - 50 %    Nutrition Risk    Level of Risk/Frequency of Follow-up: low - moderate       Monitor and Evaluation    Food and Nutrient Intake: energy intake, food and beverage intake  Food and Nutrient Adminstration: diet order  Physical Activity and Function: nutrition-related ADLs and IADLs  Anthropometric Measurements: weight, weight change, body mass index  Biochemical Data, Medical Tests and Procedures: electrolyte and renal panel, glucose/endocrine profile, lipid profile, inflammatory profile, gastrointestinal profile  Nutrition-Focused Physical Findings: overall appearance       Nutrition Follow-Up    RD Follow-up?: Yes

## 2022-05-02 NOTE — PLAN OF CARE
[facility] discussed with pt on collateral consent.Pt signed for his girlfriend Candie Urrutia [phone]. [facility] attempted to contact Candie to discuss pt admission to the hospital. Candie stated:       Reason for admission- describe what happened?  To detox to get off heroin. He needs to detox before he comes to [place] with me so that is why he admitted himself to the hospital. He does have this Parinoidd feeling when he is detoxing like he mumbles under his breath and always thinks that people are talking about him and he is very paranoid.     Prior treatment places and dates-doctor name and location  Reason for prior treatment- same or different  How long has patient had problem(s)?  He has been doing the drug thing since he was about 16 years of age.     Substance abuse- what , how long, how much, how often?  His choice of drug is heroin and has done this since he was 16 years old. I know that he spends about 650 dollars a week on it, but I do not know how much of it he uses.   Legal Issues- Current charges, type of offense, probation or parole?  He has no legal issues at this time.  History of suicide attempts- when and what methods, did they require medical attention?  He has no history of suicide attempts  Alcohol Use-  What preference of alcohol, how much, how long, how often?  He does not drink alcohol.  History of violence-describe behaviors and triggers  No history of violence  Any guns or weapons in the home? If yes, recommend that these be secured.  There are no guns in the home and she will make sure that all sharp objects are secured.  What is patients baseline behavior/mood- how well do they know if patient is doing well?  He acts, talks,and walks, and is very different with everyone.  What helps the patient stay well?  Internal/external coping strategies ( attending meetings, going to groups, taking medications, spending time with family ( etc)?  To stay off  the heroin and all drugs.   Discharge  plan:  Where will the patient live upon discharge?  He will move in with me here in [place]  Who else in the home?  I live with my mom for now until he comes up to [place].   Will someone be able to pick the patient up when discharged?   He will drive himself home because he drove himself there.

## 2022-05-02 NOTE — PROGRESS NOTES
"   05/02/22 1100   [facility] Group Therapy   Group Name Therapeutic Recreation   Specific Interventions Cognitive Stimulation Training   Participation Level Appropriate;Attentive   Participation Quality Cooperative   Insight/Motivation Improved;Applies New Skills   Affect/Mood Display Appropriate   Cognition Alert   Psychomotor WNL   Patient shows interest and initial ability to follow directions. Patient reports an"alright" mood, states he just came here to detox and is ready to go home.  "

## 2022-05-02 NOTE — PLAN OF CARE
Recommendation:   1. Regular diet appropriate   2. Appetite stimulant per MD   3. Encourage adequate intake of meals as needed   4. Boost Plus BID for intake support    Goals: pt to meet at least 75% EEN by f/u  Nutrition Goal Status: new

## 2022-05-03 VITALS
DIASTOLIC BLOOD PRESSURE: 62 MMHG | HEART RATE: 51 BPM | WEIGHT: 146.06 LBS | SYSTOLIC BLOOD PRESSURE: 114 MMHG | RESPIRATION RATE: 16 BRPM | HEIGHT: 70 IN | BODY MASS INDEX: 20.91 KG/M2 | TEMPERATURE: 97 F

## 2022-05-03 PROCEDURE — 90833 PR PSYCHOTHERAPY W/PATIENT W/E&M, 30 MIN (ADD ON): ICD-10-PCS | Mod: AF,HB,, | Performed by: PSYCHIATRY & NEUROLOGY

## 2022-05-03 PROCEDURE — 90833 PSYTX W PT W E/M 30 MIN: CPT | Mod: AF,HB,, | Performed by: PSYCHIATRY & NEUROLOGY

## 2022-05-03 PROCEDURE — 99239 HOSP IP/OBS DSCHRG MGMT >30: CPT | Mod: AF,HB,, | Performed by: PSYCHIATRY & NEUROLOGY

## 2022-05-03 PROCEDURE — 25000003 PHARM REV CODE 250: Performed by: PSYCHIATRY & NEUROLOGY

## 2022-05-03 PROCEDURE — 99239 PR HOSPITAL DISCHARGE DAY,>30 MIN: ICD-10-PCS | Mod: AF,HB,, | Performed by: PSYCHIATRY & NEUROLOGY

## 2022-05-03 PROCEDURE — S4991 NICOTINE PATCH NONLEGEND: HCPCS | Performed by: PSYCHIATRY & NEUROLOGY

## 2022-05-03 RX ORDER — IBUPROFEN 200 MG
1 TABLET ORAL DAILY
COMMUNITY
Start: 2022-05-03

## 2022-05-03 RX ORDER — MIRTAZAPINE 15 MG/1
15 TABLET, FILM COATED ORAL NIGHTLY
Qty: 30 TABLET | Refills: 11 | Status: ON HOLD | OUTPATIENT
Start: 2022-05-03 | End: 2024-02-26

## 2022-05-03 RX ADMIN — THERA TABS 1 TABLET: TAB at 09:05

## 2022-05-03 RX ADMIN — QUETIAPINE FUMARATE 25 MG: 25 TABLET ORAL at 01:05

## 2022-05-03 RX ADMIN — CYCLOBENZAPRINE HYDROCHLORIDE 5 MG: 5 TABLET, FILM COATED ORAL at 01:05

## 2022-05-03 RX ADMIN — FOLIC ACID 1 MG: 1 TABLET ORAL at 09:05

## 2022-05-03 RX ADMIN — NICOTINE 1 PATCH: 14 PATCH, EXTENDED RELEASE TRANSDERMAL at 09:05

## 2022-05-03 RX ADMIN — PROMETHAZINE HYDROCHLORIDE 25 MG: 25 TABLET ORAL at 04:05

## 2022-05-03 NOTE — PROGRESS NOTES
Psychotherapy:  · Target symptoms: substance abuse  · Why chosen therapy is appropriate versus another modality: relevant to diagnosis  · Outcome monitoring methods: self-report, lab data, observation  · Therapeutic intervention type: insight oriented psychotherapy  · Topics discussed/themes: building skills sets for symptom management, symptom recognition  · -safety planning and wrap up session  · The patient's response to the intervention is accepting. The patient's progress toward treatment goals is good.   · Duration of intervention: 16 minutes.    Anshul Abraham MD  Psychiatry

## 2022-05-03 NOTE — NURSING
Patient again vomited liquid emesis. Denies pain. Instructed to decrease water ingestion while nauseated to rest GI tract. Patient agrees.

## 2022-05-03 NOTE — PLAN OF CARE
Problem: Adult Behavioral Health Plan of Care  Goal: Rounds/Family Conference  Outcome: Ongoing, Progressing  Flowsheets (Taken 5/3/2022 6554)  Participants:   psychiatrist   nursing   therapeutic recreation   other (see comments)    TREATMENT TEAM      Chief Complaint:  Pt is a 31 year old male with chief complaints with chief complaints of substance abuse.   Pt states he came in for detox from heroin.   Pt states he uses approximately 0.5g of heroin IV daily for the past 5 years. States he uses about 0.25g of meth IV every other day and his meth use is not consistent.     Pt Goal(s):    Pt states he is looking forward to moving to [place] with his girlfriend.    Current Progress:   Pt attended treatment team dressed in personal clothing  Pt affect appropriate with eyuthmic mood  Pt states he is feeling better today and is moving to [facility] with his girlfriend when he is discharged from the hospital.   Pt states when he leaves the hospital he is going to load up the AL trailer with his things from his home and getting his vacation check and then leaving to go to [place].     Program/groups:    Encourage pt to  continue to attend all groups.      Revisions to Plan:    Encourage pt to continue to attend treatment team and to continue to attend all groups.   Pt states upon discharge he is going to load up his al trailer and getting his things and moving to [place].

## 2022-05-03 NOTE — PLAN OF CARE
Patient calm and cooperative.  Denies intentions to harm self and/or others at this time. Denies auditory and/or visual hallucinations.  Affect and emotion congruent with situation.  Anxiously focused on discharge with plans to move to [place] at the end of this week with his girlfriend.  Interacts appropriately with peers and staff. Accepts meals and medications. Discussed medication and plan of care as well as healthy coping skills and techniques; patient will need continued education and reinforcement on outpatient basis to prevent substance abuse relapse.

## 2022-05-03 NOTE — PLAN OF CARE
Lying quietly in bed, eyes closed, respirations even, unlabored. Apparently asleep. Slept 5.5 hours thus far with multiple interruptions. Safety precautions maintained. Rounds done every 15 minutes. Bed is fixed in low position and room is uncluttered and pathways are clear.

## 2022-05-03 NOTE — PSYCH
Patient will be following up with [facility] at [address] [place] [address] in [place], [phone].  This clinic is a walk in clinic.  Patient will receive tobacco cessation therapy and addictions counseling along with substance abuse therapy due to a positive UDS on admit.  AVS faxed to [phone] on 5/3/2022 at 1:39 pm.

## 2022-05-03 NOTE — CARE UPDATE
[facility]  Encounter Date: 2022  1:06 AM    Discharge Date No discharge date for patient encounter.   Hospital Account: [id number]    MRN: [id number]   Guarantor: OCTAVIA GORDON JR.   Contact Serial #: [id number]         ENCOUNTER             Patient Class: IP Psych   Unit: [facility]*   [facility] Service: Psychology   Bed: 214   Admitting Provider: Russ Chappell Md   Referring Physician: Russ Chappell   Attending Provider: Russ Chappell Md   Adm Diagnosis: Substance abuse [F19.10]      PATIENT                  Name: OCTAVIA GORDON JR. : 1990 (31 yrs)   Address: [address] [place] [address] Sex: Male   City: [place] [address]       Primary Care Provider: Primary Doctor No         Primary Phone: [phone]   EMERGENCY CONTACT   Contact Name Legal Guardian? Relationship to Patient Home Phone Work Phone Mobile Phone   1. Octavia Gordon  2. Yessy Gordon      Father  Mother [phone]      GUARANTOR                  Guarantor: OCTAVIA GORDON JR.       : 1990   Address: [address] [place] [address]    Sex: [place]     [place] [address] Guarantor  Type: B/H   Relation to Patient: Self         Home Phone: [phone]   Guarantor ID: [id number]         Work Phone:     GUARANTOR EMPLOYER     Employer:             Status: NOT EMPLO*      COVERAGE          PRIMARY INSURANCE   Payor / Plan: MEDICAID[place]       Group Number: [id number]       Subscriber Name: OCTAVIA GORDON JR. Subscriber : 1990   Subscriber ID: [id number] Pat. Rel. to Subscriber: Self   Insurance Address: [address] [address]  [place] [address]       SECONDARY INSURANCE   Payor / Plan: - No Secondary Coverage -       Group Number:         Subscriber Name:   Subscriber :     Subscriber ID:   Pat. Rel. to Subscriber:     Insurance Address:            Contact Serial # ([id number])         May 3, 2022    Chart ID  ([id number])

## 2022-05-03 NOTE — DISCHARGE SUMMARY
"Discharge Summary  Psychiatry    Admit Date: 4/30/2022    Discharge Date and Time:  05/03/2022 9:31 AM    Attending Physician: Russ Chappell MD; Anshul Abraham MD     Discharge Provider: Anshul Abraham MD    Reason for Admission:  "I just want to detox"     History of Present Illness:   Ismael Gordon Jr. is a 31 y.o. male with past psychiatric history of substance abuse, depression.      Per ER Note:  Ismael Gordon Jr. is a 31 y.o. male presents with request for evaluation  Patient presents with bags packed, ready to go the Behavioral Health Unit  Patient states that he needs to get off of opiates, long-time drug addiction   Patient denies suicidal or homicidal ideation, severely depressed on HX  Not angry, not violent, patient states he wants to stay and get sober in [facility]      While in the ER, pt received IM PRN medication after he threw his meal tray at staff and became severely agitated because his food was cold.      Per Initial Interview:  Pt reports that he is here "just to detox for 5-7 days. I took vacation off work through next Friday. I want to detox and go to [facility] with my girlfriend. I get my vacation pay next Friday."     Pt reports that he uses approximately 0.5g of heroin IV daily for the past 5 years. States he uses about 0.25g of meth IV every other day. States his meth use is not consistent. States that he started using again "a coule weeks after" he was last discharged from [facility] in 08/2021.      Pt currently endorses withdrawal symptoms of anxiety, stomach upset, and leg cramps. "Yea they're coming, its gonna get bad."      Pt states he feels somewhat depressed regarding his heroin use, but otherwise denies currently suffering with depression. Pt does endorse having anxiety at baseline, though he is not currently able to characterize his symptoms of this other than saying, "yea a little bit."      Currently denies SUYAPA, AVH, paranoia.      Psych ROS:   Denies " "any consistent depression symptoms over the past 2 weeks including decreased interest/motivation/pleasure/energy/appetite/concentration/sleep.     Denies any history of manic symptoms, including decreased need for sleep, increased energy, increased goal oriented behavior, risky behavior, racing thoughts.      Denies any history of psychotic symptoms, including AVH, paranoia, thought insertion/broadcasting/withdrawal, delusions.      Endorses "a little bit" of anxiety but is not able/willing to characterize symptoms at this time. Denies KAILYN symptoms including excess worry, tension, insomnia. Denies panic attacks.      Denies history of PTSD symptoms including flashbacks, nightmares, hypervigilance.     Denies OCD and eating disorder symptoms.       Procedures Performed: * No surgery found *    Hospital Course:    Patient was admitted to the inpatient psychiatry unit after being medically cleared in the ED. Chart and labs were reviewed. The patient was stabilized as follows:      Depression: pt counseled  Symptoms were primarily substance induced and resoled with detox  -started/continue Remeron 15 mg po q HS     Anxiety: pt counseled  -remeron as above  -vistaril prn     Psychosocial stressors: pt counseled  -SW consulted and assisting with resources     Polysubstance Dependence:  pt counseled  -rehab declined; refer fr outpatient care  -stop Clonidine 0.1 mg po TID- pt no longer needing it    Nicotine Dependence:  pt counseled  -start nicotine 14 mg patch dermal q day           During hospitalization, the patient was encouraged to go to both groups and individual counseling. Patient was monitored for any side effects. A meeting was held with multidisciplinary team prior to discharge and pt's diagnosis, current medications, and follow up were discussed. The patient has been compliant with treatment and can adequately attend to activities of daily living in an independent manner. The patient denies any side effects. " "The patient denies SI, HI, plan or intent for self harm or harm to others. The patient is no longer a danger to self or others nor gravely disabled disabled. Patient discharged  in stable condition with scheduled outpatient follow up.    The patient reports improved symptoms as documented below. He is requesting discharge. He is currently stable for discharge and is able/willing to attend outpatient care. He is hopeful, future oriented and goal directed. He can identify positive social support and coping skills.    He denied current withdrawals or cravings. He declined rehab but is interested in outpatient treatment. He plans to move to [place] with his girlfriend to "change places people and things."    Psychiatric ROS (observed, reported, or endorsed/denied):  Depressed mood - denies  Interest/pleasure/anhedonia: denies  Guilt/hopelessness/worthlessness - denies  Changes in Sleep - denies  Changes in Appetite - denies  Changes in Concentration - denies  Changes in Energy - denies  PMA/R- denies  Suicidal- active/passive ideations - denies  Homicidal ideations: active/passive ideations - denies     Hallucinations - denies  Delusions - denies  Disorganized behavior - denies  Disorganized speech - denies  Negative symptoms - denies     Elevated mood - denies  Decreased need for sleep - denies  Grandiosity - denies  Racing thoughts - denies  Impulsivity - denies  Irritability- denies  Increased energy - denies  Distractibility - denies  Increase in goal-directed activity or PMA- denies     Symptoms of KAILYN - denies  Symptoms of Panic Disorder- denies  Symptoms of PTSD - denies    Discussed diagnosis, risks and benefits of proposed treatment vs alternative treatments vs no treatment, and potential side effects of these treatments.  The patient expresses understanding of the above and displays the capacity to agree with this treatment given said understanding.  Patient also agrees that, currently, the benefits outweigh the " risks and would like to pursue treatment at this time.      Discharge MSE: stated age, casually dressed, well groomed.  No psychomotor agitation or retardation.  No abnormal involuntary movements.  Gait normal.  Speech normal, conversational.  Language fluent English. Mood fine.  Affect normal range, pleasant, euthymic.  Thought process linear.  Associations intact.  Denies suicidal or homicidal ideation.  Denies auditory hallucinations, paranoid ideation, ideas of reference.  Memory intact.  Attention intact.  Fund of knowledge intact.  Insight intact.  Judgment intact.  Alert and oriented to person, place, time.      Tobacco Usage:  Is patient a smoker? Yes  Does patient want prescription for Tobacco Cessation? No  Does patient want counseling for Tobacco Cessation? No    If patient would like to quit, then over the counter nicotine patch could be used. The patient could also follow up with his PCP or psychiatric provider for other alternatives.     Final Diagnoses:    Principal Problem: Opiate induced mood disorder   Secondary Diagnoses:   Unspecified Anxiety Disorder     Psychosocial stressors     Polysubstance Dependence (benzos, cannabis, methamphetamines, and opiates; severe, continuous with physiological dependence)  Nicotine Dependence    Labs:  Admission on 04/30/2022   Component Date Value Ref Range Status    Hemoglobin A1C 04/29/2022 5.2  4.0 - 5.6 % Final    Estimated Avg Glucose 04/29/2022 103  68 - 131 mg/dL Final    Cholesterol 04/30/2022 148  120 - 199 mg/dL Final    Triglycerides 04/30/2022 46  30 - 150 mg/dL Final    HDL 04/30/2022 50  40 - 75 mg/dL Final    LDL Cholesterol 04/30/2022 88.8  63.0 - 159.0 mg/dL Final    HDL/Cholesterol Ratio 04/30/2022 33.8  20.0 - 50.0 % Final    Total Cholesterol/HDL Ratio 04/30/2022 3.0  2.0 - 5.0 Final    Non-HDL Cholesterol 04/30/2022 98  mg/dL Final   Admission on 04/29/2022, Discharged on 04/30/2022   Component Date Value Ref Range Status    Sodium  04/29/2022 142  136 - 145 mmol/L Final    Potassium 04/29/2022 3.5  3.5 - 5.1 mmol/L Final    Chloride 04/29/2022 106  95 - 110 mmol/L Final    CO2 04/29/2022 26  23 - 29 mmol/L Final    Glucose 04/29/2022 104  70 - 110 mg/dL Final    BUN 04/29/2022 27 (A) 6 - 20 mg/dL Final    Creatinine 04/29/2022 1.0  0.5 - 1.4 mg/dL Final    Calcium 04/29/2022 9.5  8.7 - 10.5 mg/dL Final    Total Protein 04/29/2022 7.6  6.0 - 8.4 g/dL Final    Albumin 04/29/2022 4.3  3.5 - 5.2 g/dL Final    Total Bilirubin 04/29/2022 0.6  0.1 - 1.0 mg/dL Final    Alkaline Phosphatase 04/29/2022 73  55 - 135 U/L Final    AST 04/29/2022 19  10 - 40 U/L Final    ALT 04/29/2022 22  10 - 44 U/L Final    Anion Gap 04/29/2022 10  8 - 16 mmol/L Final    eGFR if African American 04/29/2022 >60  >60 mL/min/1.73 m^2 Final    eGFR if non African American 04/29/2022 >60  >60 mL/min/1.73 m^2 Final    Acetaminophen (Tylenol), Serum 04/29/2022 <3.0 (A) 10.0 - 20.0 ug/mL Final    Benzodiazepines 04/29/2022 Negative  Negative Final    Methadone metabolites 04/29/2022 Negative  Negative Final    Cocaine (Metab.) 04/29/2022 Negative  Negative Final    Opiate Scrn, Ur 04/29/2022 Presumptive Positive (A) Negative Final    Barbiturate Screen, Ur 04/29/2022 Negative  Negative Final    Amphetamine Screen, Ur 04/29/2022 Presumptive Positive (A) Negative Final    THC 04/29/2022 Negative  Negative Final    Phencyclidine 04/29/2022 Negative  Negative Final    Creatinine, Urine 04/29/2022 355.3  23.0 - 375.0 mg/dL Final    Toxicology Information 04/29/2022 SEE COMMENT   Final    Specimen UA 04/29/2022 Urine, Clean Catch   Final    Color, UA 04/29/2022 Yellow  Yellow, Straw, Kelly Final    Appearance, UA 04/29/2022 Clear  Clear Final    pH, UA 04/29/2022 6.0  5.0 - 8.0 Final    Specific Gravity, UA 04/29/2022 >=1.030 (A) 1.005 - 1.030 Final    Protein, UA 04/29/2022 Negative  Negative Final    Glucose, UA 04/29/2022 Negative  Negative Final     Ketones, UA 04/29/2022 Negative  Negative Final    Bilirubin (UA) 04/29/2022 Negative  Negative Final    Occult Blood UA 04/29/2022 Negative  Negative Final    Nitrite, UA 04/29/2022 Negative  Negative Final    Urobilinogen, UA 04/29/2022 Negative  <2.0 EU/dL Final    Leukocytes, UA 04/29/2022 Negative  Negative Final    Salicylate Lvl 04/29/2022 <5.0 (A) 15.0 - 30.0 mg/dL Final    WBC 04/29/2022 8.63  3.90 - 12.70 K/uL Final    RBC 04/29/2022 4.65  4.60 - 6.20 M/uL Final    Hemoglobin 04/29/2022 13.0 (A) 14.0 - 18.0 g/dL Final    Hematocrit 04/29/2022 39.9 (A) 40.0 - 54.0 % Final    MCV 04/29/2022 86  82 - 98 fL Final    MCH 04/29/2022 28.0  27.0 - 31.0 pg Final    MCHC 04/29/2022 32.6  32.0 - 36.0 g/dL Final    RDW 04/29/2022 13.4  11.5 - 14.5 % Final    Platelets 04/29/2022 310  150 - 450 K/uL Final    MPV 04/29/2022 9.5  9.2 - 12.9 fL Final    Immature Granulocytes 04/29/2022 0.3  0.0 - 0.5 % Final    Gran # (ANC) 04/29/2022 4.5  1.8 - 7.7 K/uL Final    Immature Grans (Abs) 04/29/2022 0.03  0.00 - 0.04 K/uL Final    Lymph # 04/29/2022 3.2  1.0 - 4.8 K/uL Final    Mono # 04/29/2022 0.7  0.3 - 1.0 K/uL Final    Eos # 04/29/2022 0.1  0.0 - 0.5 K/uL Final    Baso # 04/29/2022 0.07  0.00 - 0.20 K/uL Final    nRBC 04/29/2022 0  0 /100 WBC Final    Gran % 04/29/2022 52.6  38.0 - 73.0 % Final    Lymph % 04/29/2022 36.7  18.0 - 48.0 % Final    Mono % 04/29/2022 8.1  4.0 - 15.0 % Final    Eosinophil % 04/29/2022 1.5  0.0 - 8.0 % Final    Basophil % 04/29/2022 0.8  0.0 - 1.9 % Final    Differential Method 04/29/2022 Automated   Final    SARS-CoV-2 RNA, Amplification, Qual 04/29/2022 Negative  Negative Final    TSH 04/29/2022 1.917  0.400 - 4.000 uIU/mL Final    Vit D, 25-Hydroxy 04/29/2022 32  30 - 96 ng/mL Final    Vitamin B-12 04/29/2022 662  210 - 950 pg/mL Final    Free T4 04/29/2022 0.98  0.71 - 1.51 ng/dL Final    Folate 04/29/2022 11.2  4.0 - 24.0 ng/mL Final    Alcohol,  Serum 04/29/2022 <10  <10 mg/dL Final         Discharged Condition: stable and improved; not currently a danger to self/others or gravely disabled    Disposition: Home or Self Care    Is patient being discharged on multiple neuroleptics? No    Follow Up/Patient Instructions:     · Take all medications as prescribed.  · Attend all psychiatric and medical follow up appointments.   · Abstain from all drugs and alcohol.  · Call the crisis line at: [phone] for help in a crisis and emergent situations or call 911 and Return to ED for any acute worsening of your condition including suicidal or homicidal ideations      No discharge procedures on file.        Follow up apt: local [facility]- see SW/discharge notes      Medications:  Reconciled Home Medications:      Medication List      START taking these medications    nicotine 14 mg/24 hr  Commonly known as: NICODERM CQ  Place 1 patch onto the skin once daily.        CHANGE how you take these medications    mirtazapine 15 MG tablet  Commonly known as: REMERON  Take 1 tablet (15 mg total) by mouth every evening.  What changed:   · medication strength  · how much to take        ASK your doctor about these medications    hydrOXYzine pamoate 50 MG Cap  Commonly known as: VISTARIL  Take 1 capsule (50 mg total) by mouth every evening.              Diet: regular     Activity as tolerated    Total time spent discharging patient: 35 minutes    Anshul Abraham MD  Psychiatry

## 2022-05-03 NOTE — PLAN OF CARE
Plan of care reviewed. Denies intent to harm self or others at this time. Accepts snacks and medications. Gait steady, no falls. Limited interaction noted with staff and peers. Patient is looking forward to discharge tomorrow. Promoted individualized safety plan, reality-based interactions, effective coping strategies, and impulse control. Will continue precautions and monitor for safety.

## 2022-05-03 NOTE — MEDICAL/APP STUDENT
PSYCHIATRY DAILY INPATIENT PROGRESS NOTE  SUBSEQUENT HOSPITAL VISIT    ENCOUNTER DATE: 5/3/2022  SITE: [facility]    DATE OF ADMISSION: 4/30/2022  1:06 AM  LENGTH OF STAY: 3 days      CHIEF COMPLAINT   Ismael Gordon Jr. is a 31 y.o. male, seen during daily white rounds on the inpatient unit.  Ismael Gordon Jr. presented with the chief complaint of substance problems      The patient was seen and examined. The chart was reviewed.     Reviewed notes from MD and labs from the last 24 hours.    The patient's case was discussed with the treatment team/care providers today including MD    Staff reports some behavioral or management issues.     The patient has been compliant with treatment.      Subjective 05/03/2022       Today the patient reports he is feeling well. However, last night pt vomited 3 times and had difficulties eating, sleeping, or drinking fluids. Today pt reports he is no longer vomiting. Pt denies any other complaints.      The patient denies any side effects to medications.        Interim/overnight events per report/notes:          Psychiatric ROS (observed, reported, or endorsed/denied):  Depressed mood - denies  Interest/pleasure/anhedonia: denies  Guilt/hopelessness/worthlessness - denies  Changes in Sleep - denies  Changes in Appetite - denies  Changes in Concentration - denies  Changes in Energy - denies  PMA/R- denies  Suicidal- active/passive ideations - denies  Homicidal ideations: active/passive ideations - denies    Hallucinations - denies  Delusions - denies  Disorganized behavior - denies  Disorganized speech - denies  Negative symptoms - denies    Elevated mood - denies  Decreased need for sleep - denies  Grandiosity - denies  Racing thoughts - denies  Impulsivity - denies  Irritability- denies  Increased energy - denies  Distractibility - denies  Increase in goal-directed activity or PMA- denies    Symptoms of KAILYN - denies  Symptoms of Panic Disorder- denies  Symptoms of  "PTSD - denies        Overall progress: Patient is showing moderate improvement        Psychotherapy:  · Target symptoms: substance abuse  · Why chosen therapy is appropriate versus another modality: relevant to diagnosis  · Outcome monitoring methods: self-report  · Therapeutic intervention type: insight oriented psychotherapy, behavior modifying psychotherapy, supportive psychotherapy, interactive psychotherapy  · Topics discussed/themes: relationships difficulties, substance abuse  · The patient's response to the intervention is accepting. The patient's progress toward treatment goals is fair.   · Duration of intervention: 15 minutes.        Medical ROS  ROS      PAST MEDICAL HISTORY   Past Medical History:   Diagnosis Date    Bipolar 1 disorder     Depression     Hepatitis C     History of psychiatric hospitalization     "It has all been to get to previous 28 day programs."    Hx of psychiatric care     Psychiatric exam requested by authority     Psychiatric problem     Suicide attempt 01/16/2017    'I did an overdose on heroin so you could say passive suicide in my opinion."    Therapy            PSYCHOTROPIC MEDICATIONS   Scheduled Meds:   folic acid  1 mg Oral Daily    mirtazapine  15 mg Oral QHS    multivitamin  1 tablet Oral Daily    nicotine  1 patch Transdermal Daily     Continuous Infusions:  PRN Meds:.cyclobenzaprine, dicyclomine, hydrOXYzine pamoate, ibuprofen, loperamide, OLANZapine **AND** OLANZapine, promethazine, QUEtiapine        EXAMINATION    VITALS   Vitals:    05/02/22 1238 05/02/22 2036 05/02/22 2300 05/03/22 0750   BP: 121/75 (!) 113/56  114/62   BP Location: Left arm Right arm     Patient Position: Sitting Sitting     Pulse: 73 (!) 56  (!) 51   Resp: 18 18  16   Temp: 97.9 °F (36.6 °C) 97.2 °F (36.2 °C)  97.3 °F (36.3 °C)   TempSrc: Temporal Temporal  Temporal   Weight:   66.3 kg (146 lb 0.9 oz)    Height:   5' 10" (1.778 m)        Body mass index is 20.96 " kg/m².        CONSTITUTIONAL  General Appearance: disheveled, pt appears unkempt and dirty    MUSCULOSKELETAL  Muscle Strength and Tone:no tremor, no tic  Abnormal Involuntary Movements: No  Gait and Station: non-ataxic    PSYCHIATRIC   Level of Consciousness: awake and alert   Orientation: person, place and situation  Grooming: Disheveled and Poorly groomed  Psychomotor Behavior: normal, cooperative  Speech: normal tone, normal rate, normal pitch, normal volume  Language: grossly intact  Mood: fine  Affect: Consistent with mood  Thought Process: linear, logical  Associations: intact   Thought Content: no delusions  Perceptions: not RIS  Memory: Able to recall past events, Remote intact and Recent intact  Attention:Attends to interview without distraction  Fund of Knowledge: unable to assess  Estimate if Intelligence:  Unable to determine based on work/education history, vocabulary and mental status exam  Insight: limited awareness of illness  Judgment: behavior is adequate to circumstances        DIAGNOSTIC TESTING   Laboratory Results  No results found for this or any previous visit (from the past 24 hour(s)).          MEDICAL DECISION MAKING      ASSESSMENT:   Ismael is a 32 y/o M with Hx of ongoing substance abuse. Pt has completed his detox. Pt's mood is currently stable; he should be ready for discharge.        PROBLEM LIST AND MANAGEMENT PLANS    Substance abuse: Pt has tried rehab multiple times, but failed            Discussed diagnosis, risks and benefits of proposed treatment vs alternative treatments vs no treatment, potential side effects of these treatments and the inherent unpredictability of treatment. The patient expresses understanding of the above and displays the capacity to agree with this treatment given said understanding. Patient also agrees that, currently, the benefits outweigh the risks and would like to pursue/continue treatment at this time.      DISCHARGE PLANNING  Expected Disposition  Plan: Home or Self Care      NEED FOR CONTINUED HOSPITALIZATION  Psychiatric illness continues to pose a potential threat to life or bodily function, of self or others, thereby requiring the need for continued inpatient psychiatric hospitalization: Yes, due to: ongoing subtance abuse, as evidenced by:  Concerns with SI--Fading.    Protective inpatient pyschiatric hospitalization required while a safe disposition plan is enacted: Yes    Patient stabilized and ready for discharge from inpatient psychiatric unit: No        STAFF:   Cynthia Han MD  Psychiatry

## 2022-05-03 NOTE — NURSING
Vomited moderate amount of liquid on floor. Patient states he became suddenly nauseated and vomited before he could make it to bathroom. States feels better following this episode of n/v.

## 2022-05-03 NOTE — NURSING
All belongings accounted for and will be physically given to patient upon discharge.  Patient denies intentions to harm self and/or others at this time.  No acute distress noted. Will discharge to home with plans to follow up in [facility]; patient left via personal vehicle parked in [place] parking lot.  Patient educated on discharge plan, aftercare appointments, and medications; prescriptions sent electronically to requested [facility] in [place].

## 2023-03-05 ENCOUNTER — HOSPITAL ENCOUNTER (EMERGENCY)
Facility: HOSPITAL | Age: 33
Discharge: PSYCHIATRIC OR SUBSTANCE USE FACILITY | End: 2023-03-06
Attending: SURGERY
Payer: MEDICAID

## 2023-03-05 DIAGNOSIS — F19.10 POLYSUBSTANCE ABUSE: Primary | ICD-10-CM

## 2023-03-05 LAB
ALBUMIN SERPL BCP-MCNC: 3.9 G/DL (ref 3.5–5.2)
ALP SERPL-CCNC: 69 U/L (ref 55–135)
ALT SERPL W/O P-5'-P-CCNC: 28 U/L (ref 10–44)
AMPHET+METHAMPHET UR QL: ABNORMAL
ANION GAP SERPL CALC-SCNC: 9 MMOL/L (ref 8–16)
APAP SERPL-MCNC: <3 UG/ML (ref 10–20)
AST SERPL-CCNC: 58 U/L (ref 10–40)
BACTERIA #/AREA URNS HPF: ABNORMAL /HPF
BARBITURATES UR QL SCN>200 NG/ML: NEGATIVE
BASOPHILS # BLD AUTO: 0.05 K/UL (ref 0–0.2)
BASOPHILS NFR BLD: 0.7 % (ref 0–1.9)
BENZODIAZ UR QL SCN>200 NG/ML: ABNORMAL
BILIRUB SERPL-MCNC: 0.4 MG/DL (ref 0.1–1)
BILIRUB UR QL STRIP: NEGATIVE
BUN SERPL-MCNC: 19 MG/DL (ref 6–20)
BZE UR QL SCN: NEGATIVE
CALCIUM SERPL-MCNC: 9.3 MG/DL (ref 8.7–10.5)
CANNABINOIDS UR QL SCN: ABNORMAL
CHLORIDE SERPL-SCNC: 106 MMOL/L (ref 95–110)
CLARITY UR: CLEAR
CO2 SERPL-SCNC: 26 MMOL/L (ref 23–29)
COLOR UR: YELLOW
CREAT SERPL-MCNC: 0.9 MG/DL (ref 0.5–1.4)
CREAT UR-MCNC: 309.7 MG/DL (ref 23–375)
DIFFERENTIAL METHOD: ABNORMAL
EOSINOPHIL # BLD AUTO: 0.6 K/UL (ref 0–0.5)
EOSINOPHIL NFR BLD: 8.8 % (ref 0–8)
ERYTHROCYTE [DISTWIDTH] IN BLOOD BY AUTOMATED COUNT: 13.2 % (ref 11.5–14.5)
EST. GFR  (NO RACE VARIABLE): >60 ML/MIN/1.73 M^2
ETHANOL SERPL-MCNC: <10 MG/DL
GLUCOSE SERPL-MCNC: 116 MG/DL (ref 70–110)
GLUCOSE UR QL STRIP: NEGATIVE
HCT VFR BLD AUTO: 36.4 % (ref 40–54)
HGB BLD-MCNC: 11.7 G/DL (ref 14–18)
HGB UR QL STRIP: NEGATIVE
HYALINE CASTS #/AREA URNS LPF: 0 /LPF
IMM GRANULOCYTES # BLD AUTO: 0.02 K/UL (ref 0–0.04)
IMM GRANULOCYTES NFR BLD AUTO: 0.3 % (ref 0–0.5)
KETONES UR QL STRIP: NEGATIVE
LEUKOCYTE ESTERASE UR QL STRIP: NEGATIVE
LYMPHOCYTES # BLD AUTO: 1.7 K/UL (ref 1–4.8)
LYMPHOCYTES NFR BLD: 23.4 % (ref 18–48)
MCH RBC QN AUTO: 27.5 PG (ref 27–31)
MCHC RBC AUTO-ENTMCNC: 32.1 G/DL (ref 32–36)
MCV RBC AUTO: 85 FL (ref 82–98)
METHADONE UR QL SCN>300 NG/ML: NEGATIVE
MICROSCOPIC COMMENT: ABNORMAL
MONOCYTES # BLD AUTO: 0.6 K/UL (ref 0.3–1)
MONOCYTES NFR BLD: 8.1 % (ref 4–15)
NEUTROPHILS # BLD AUTO: 4.3 K/UL (ref 1.8–7.7)
NEUTROPHILS NFR BLD: 58.7 % (ref 38–73)
NITRITE UR QL STRIP: NEGATIVE
NRBC BLD-RTO: 0 /100 WBC
OPIATES UR QL SCN: ABNORMAL
PCP UR QL SCN>25 NG/ML: NEGATIVE
PH UR STRIP: 6 [PH] (ref 5–8)
PLATELET # BLD AUTO: 283 K/UL (ref 150–450)
PMV BLD AUTO: 9.9 FL (ref 9.2–12.9)
POTASSIUM SERPL-SCNC: 3.6 MMOL/L (ref 3.5–5.1)
PROT SERPL-MCNC: 6.9 G/DL (ref 6–8.4)
PROT UR QL STRIP: ABNORMAL
RBC # BLD AUTO: 4.26 M/UL (ref 4.6–6.2)
RBC #/AREA URNS HPF: 1 /HPF (ref 0–4)
SALICYLATES SERPL-MCNC: <5 MG/DL (ref 15–30)
SODIUM SERPL-SCNC: 141 MMOL/L (ref 136–145)
SP GR UR STRIP: >=1.03 (ref 1–1.03)
SQUAMOUS #/AREA URNS HPF: 1 /HPF
T4 FREE SERPL-MCNC: 1.04 NG/DL (ref 0.71–1.51)
TOXICOLOGY INFORMATION: ABNORMAL
TSH SERPL DL<=0.005 MIU/L-ACNC: 1.18 UIU/ML (ref 0.4–4)
URN SPEC COLLECT METH UR: ABNORMAL
UROBILINOGEN UR STRIP-ACNC: 1 EU/DL
WBC # BLD AUTO: 7.28 K/UL (ref 3.9–12.7)
WBC #/AREA URNS HPF: 6 /HPF (ref 0–5)

## 2023-03-05 PROCEDURE — 82077 ASSAY SPEC XCP UR&BREATH IA: CPT | Performed by: SURGERY

## 2023-03-05 PROCEDURE — 80053 COMPREHEN METABOLIC PANEL: CPT | Performed by: SURGERY

## 2023-03-05 PROCEDURE — 84443 ASSAY THYROID STIM HORMONE: CPT | Performed by: SURGERY

## 2023-03-05 PROCEDURE — 80179 DRUG ASSAY SALICYLATE: CPT | Performed by: SURGERY

## 2023-03-05 PROCEDURE — 85025 COMPLETE CBC W/AUTO DIFF WBC: CPT | Performed by: SURGERY

## 2023-03-05 PROCEDURE — 80143 DRUG ASSAY ACETAMINOPHEN: CPT | Performed by: SURGERY

## 2023-03-05 PROCEDURE — 84439 ASSAY OF FREE THYROXINE: CPT | Performed by: SURGERY

## 2023-03-05 PROCEDURE — S4991 NICOTINE PATCH NONLEGEND: HCPCS | Performed by: SURGERY

## 2023-03-05 PROCEDURE — 82306 VITAMIN D 25 HYDROXY: CPT | Performed by: SURGERY

## 2023-03-05 PROCEDURE — 80307 DRUG TEST PRSMV CHEM ANLYZR: CPT | Performed by: SURGERY

## 2023-03-05 PROCEDURE — 36415 COLL VENOUS BLD VENIPUNCTURE: CPT | Performed by: SURGERY

## 2023-03-05 PROCEDURE — 25000003 PHARM REV CODE 250: Performed by: SURGERY

## 2023-03-05 PROCEDURE — 99285 EMERGENCY DEPT VISIT HI MDM: CPT

## 2023-03-05 PROCEDURE — 84425 ASSAY OF VITAMIN B-1: CPT | Performed by: SURGERY

## 2023-03-05 PROCEDURE — 81000 URINALYSIS NONAUTO W/SCOPE: CPT | Mod: 59 | Performed by: SURGERY

## 2023-03-05 PROCEDURE — 82746 ASSAY OF FOLIC ACID SERUM: CPT | Performed by: SURGERY

## 2023-03-05 PROCEDURE — 82607 VITAMIN B-12: CPT | Performed by: SURGERY

## 2023-03-05 RX ORDER — LORAZEPAM 2 MG/ML
2 INJECTION INTRAMUSCULAR EVERY 4 HOURS PRN
Status: DISCONTINUED | OUTPATIENT
Start: 2023-03-05 | End: 2023-03-06 | Stop reason: HOSPADM

## 2023-03-05 RX ORDER — DIPHENHYDRAMINE HYDROCHLORIDE 50 MG/ML
50 INJECTION INTRAMUSCULAR; INTRAVENOUS EVERY 4 HOURS PRN
Status: DISCONTINUED | OUTPATIENT
Start: 2023-03-05 | End: 2023-03-06 | Stop reason: HOSPADM

## 2023-03-05 RX ORDER — HALOPERIDOL 5 MG/ML
5 INJECTION INTRAMUSCULAR EVERY 4 HOURS PRN
Status: DISCONTINUED | OUTPATIENT
Start: 2023-03-05 | End: 2023-03-06 | Stop reason: HOSPADM

## 2023-03-05 RX ORDER — IBUPROFEN 200 MG
1 TABLET ORAL
Status: DISCONTINUED | OUTPATIENT
Start: 2023-03-05 | End: 2023-03-06 | Stop reason: HOSPADM

## 2023-03-05 RX ADMIN — NICOTINE 1 PATCH: 21 PATCH, EXTENDED RELEASE TRANSDERMAL at 08:03

## 2023-03-06 VITALS
SYSTOLIC BLOOD PRESSURE: 112 MMHG | DIASTOLIC BLOOD PRESSURE: 60 MMHG | TEMPERATURE: 98 F | HEART RATE: 70 BPM | WEIGHT: 155.63 LBS | RESPIRATION RATE: 16 BRPM | BODY MASS INDEX: 22.28 KG/M2 | HEIGHT: 70 IN | OXYGEN SATURATION: 98 %

## 2023-03-06 LAB
25(OH)D3+25(OH)D2 SERPL-MCNC: 30 NG/ML (ref 30–96)
FOLATE SERPL-MCNC: 11.6 NG/ML (ref 4–24)
VIT B12 SERPL-MCNC: 565 PG/ML (ref 210–950)

## 2023-03-06 NOTE — ED NOTES
SPD HERE TO TRANSPORT PATIENT TO [facility]. PATIENT ESCORTED OUT VIA WHEELCHAIR BREATHS EQUAL AND UNLABORED. PATIENT IN NO ACUTE DISTRESS. PATIENT ESCORTED BY [facility] EMPLOYEE, [employer] AND RN. BELONGINGS GIVEN TO [facility].

## 2023-03-06 NOTE — ED PROVIDER NOTES
"Encounter Date: 3/5/2023       History     Chief Complaint   Patient presents with    Psychiatric Evaluation     Patient arrived to ED with c/o wanting to detox off of heroin. Patient denies any SI, HI or Hallucinations. NADN in triage.      Ismael Gordon Jr. is a 32 y.o. male that presents with substance abuse issues  Patient states that he is addicted to heroin, stating that he needs psychiatric evaluation  Patient also states that he needs to get back on his bipolar medication, noncompliant  Alert, appropriate, not psychotic, not suicidal, not homicidal on ED interview today  Patient states he has been working with [facility] for substance abuse  Patient needs evaluation & eventually hopes to get on a shot help heroin addiction    Review of patient's allergies indicates:  No Known Allergies    Past Medical History:   Diagnosis Date    Bipolar 1 disorder     Depression     Hepatitis C     History of psychiatric hospitalization     "It has all been to get to previous 28 day programs."    Hx of psychiatric care     Psychiatric exam requested by authority     Psychiatric problem     Suicide attempt 01/16/2017    'I did an overdose on heroin so you could say passive suicide in my opinion."    Therapy      Past Surgical History:   Procedure Laterality Date    CHOLECYSTECTOMY       Family History   Problem Relation Age of Onset    Alcohol abuse Mother     No Known Problems Father     No Known Problems Sister     Drug abuse Brother     Anxiety disorder Maternal Aunt     Drug abuse Maternal Aunt     Drug abuse Paternal Aunt     Drug abuse Maternal Uncle     Anxiety disorder Maternal Uncle     Drug abuse Paternal Uncle     No Known Problems Maternal Grandfather     No Known Problems Maternal Grandmother     No Known Problems Paternal Grandfather     No Known Problems Paternal Grandmother     Drug abuse Cousin     Anxiety disorder Cousin     Alcohol abuse Cousin     No Known Problems Maternal Aunt     " "Drug abuse Maternal Aunt     No Known Problems Paternal Cousin      Social History     Tobacco Use    Smoking status: Every Day     Packs/day: 0.25     Years: 7.00     Pack years: 1.75     Types: Cigarettes     Start date: 2009    Smokeless tobacco: Current    Tobacco comments:     "By default I have quit because I can no longer afford it."    Substance Use Topics    Alcohol use: No    Drug use: Yes     Types: Methamphetamines, Marijuana     Comment: Feb 2015 heroin "but very rarely but when I do I blow out."       Review of Systems   Constitutional:  Negative for diaphoresis, fatigue and fever.   HENT:  Negative for ear pain, hearing loss and tinnitus.    Eyes:  Negative for pain and redness.   Respiratory:  Negative for cough, shortness of breath and wheezing.    Cardiovascular:  Negative for chest pain.   Gastrointestinal:  Negative for abdominal pain, constipation, diarrhea, nausea and rectal pain.   Musculoskeletal:  Negative for back pain, neck pain and neck stiffness.   Neurological:  Negative for dizziness, seizures, numbness and headaches.   Psychiatric/Behavioral:  Positive for dysphoric mood. Negative for confusion and decreased concentration. The patient is nervous/anxious.    All other systems reviewed and are negative.    Physical Exam     Initial Vitals [03/05/23 1928]   BP Pulse Resp Temp SpO2   (!) 140/75 (!) 115 18 98.3 °F (36.8 °C) 95 %      MAP       --         Physical Exam    Nursing note and vitals reviewed.  Constitutional: Vital signs are normal. He appears well-developed and well-nourished. He is cooperative.   HENT:   Head: Normocephalic and atraumatic.   Right Ear: Hearing, tympanic membrane, external ear and ear canal normal.   Left Ear: Hearing, tympanic membrane, external ear and ear canal normal.   Nose: Nose normal.   Mouth/Throat: Uvula is midline, oropharynx is clear and moist and mucous membranes are normal.   Eyes: Conjunctivae, EOM and lids are normal. Pupils are equal, round, " and reactive to light.   Neck: Neck supple. No JVD present.   Normal range of motion.   Full passive range of motion without pain.     Cardiovascular:  Normal rate, regular rhythm, S1 normal, S2 normal, normal heart sounds, intact distal pulses and normal pulses.           Pulmonary/Chest: Effort normal and breath sounds normal.   Abdominal: Abdomen is soft and flat. Bowel sounds are normal.   Musculoskeletal:         General: Normal range of motion.      Cervical back: Full passive range of motion without pain, normal range of motion and neck supple.     Neurological: He is alert and oriented to person, place, and time. He has normal strength.   Skin: Skin is warm, dry and intact. Capillary refill takes less than 2 seconds.       ED Course   Procedures  Labs Reviewed   CBC W/ AUTO DIFFERENTIAL - Abnormal; Notable for the following components:       Result Value    RBC 4.26 (*)     Hemoglobin 11.7 (*)     Hematocrit 36.4 (*)     Eos # 0.6 (*)     Eosinophil % 8.8 (*)     All other components within normal limits   COMPREHENSIVE METABOLIC PANEL   ACETAMINOPHEN LEVEL   SALICYLATE LEVEL   URINALYSIS, REFLEX TO URINE CULTURE   DRUG SCREEN PANEL, URINE EMERGENCY   TSH   ALCOHOL,MEDICAL (ETHANOL)   VITAMIN B1   T4, FREE   VITAMIN B12   FOLATE   VITAMIN D   URINALYSIS MICROSCOPIC          Imaging Results    None          Medications   haloperidol lactate injection 5 mg (has no administration in time range)   LORazepam injection 2 mg (has no administration in time range)   diphenhydrAMINE injection 50 mg (has no administration in time range)     Medical Decision Makin-year-old man presents with substance abuse issues & bipolar disorder  Noncompliant with all bipolar disorder medications for several months now  Patient is not angry or violent but states he needs help with substance abuse  Patient is not going through any detox or delirium tremens on evaluation  Patient is medically cleared for psychiatric placement &  evaluation today              Medically cleared for psychiatry placement: 3/5/2023  8:04 PM         Clinical Impression:   Final diagnoses:  [F19.10] Polysubstance abuse (Primary)        ED Disposition Condition    Transfer to Psych Facility Stable               Darin Fan MD  03/05/23 2005

## 2023-03-06 NOTE — ED TRIAGE NOTES
32 y.o. male presents to ER ED 03 /ED 03A   Chief Complaint   Patient presents with    Psychiatric Evaluation     Patient arrived to ED with c/o wanting to detox off of heroin. Patient denies any SI, HI or Hallucinations. NADN in triage.    .Patient reports that he would like to start the Vivitrol injections through [facility] but in order to do that they require at least 3 days sober.   . Last use of Heroin is reported by patient to be 1-2 hours PTA, Meth use last night (3/4/23), Marijuana last use today PTA, Xanax last use Friday (3/3/23)  . No acute distress noted.

## 2023-03-09 LAB — VIT B1 BLD-MCNC: 69 UG/L (ref 38–122)

## 2024-02-19 ENCOUNTER — HOSPITAL ENCOUNTER (INPATIENT)
Facility: HOSPITAL | Age: 34
LOS: 7 days | Discharge: HOME | DRG: 885 | End: 2024-02-26
Attending: STUDENT IN AN ORGANIZED HEALTH CARE EDUCATION/TRAINING PROGRAM | Admitting: STUDENT IN AN ORGANIZED HEALTH CARE EDUCATION/TRAINING PROGRAM

## 2024-02-19 ENCOUNTER — HOSPITAL ENCOUNTER (EMERGENCY)
Facility: HOSPITAL | Age: 34
Discharge: PSYCHIATRIC OR SUBSTANCE USE FACILITY | End: 2024-02-19
Attending: SURGERY

## 2024-02-19 VITALS
SYSTOLIC BLOOD PRESSURE: 99 MMHG | HEART RATE: 71 BPM | BODY MASS INDEX: 23.99 KG/M2 | WEIGHT: 167.25 LBS | RESPIRATION RATE: 18 BRPM | DIASTOLIC BLOOD PRESSURE: 57 MMHG | OXYGEN SATURATION: 98 % | TEMPERATURE: 98 F

## 2024-02-19 DIAGNOSIS — F11.90 OPIOID USE DISORDER: Primary | ICD-10-CM

## 2024-02-19 DIAGNOSIS — F32.A DEPRESSION: ICD-10-CM

## 2024-02-19 DIAGNOSIS — F19.10 POLYSUBSTANCE ABUSE: Primary | ICD-10-CM

## 2024-02-19 DIAGNOSIS — F43.9 STRESS: ICD-10-CM

## 2024-02-19 DIAGNOSIS — F41.9 ANXIETY: ICD-10-CM

## 2024-02-19 DIAGNOSIS — F32.A DEPRESSION, UNSPECIFIED DEPRESSION TYPE: ICD-10-CM

## 2024-02-19 DIAGNOSIS — Z00.8 MEDICAL CLEARANCE FOR PSYCHIATRIC ADMISSION: ICD-10-CM

## 2024-02-19 DIAGNOSIS — F11.20 HEROIN ADDICTION: ICD-10-CM

## 2024-02-19 LAB
ALBUMIN SERPL BCP-MCNC: 4.1 G/DL (ref 3.5–5.2)
ALP SERPL-CCNC: 68 U/L (ref 55–135)
ALT SERPL W/O P-5'-P-CCNC: 22 U/L (ref 10–44)
AMPHET+METHAMPHET UR QL: ABNORMAL
ANION GAP SERPL CALC-SCNC: 9 MMOL/L (ref 8–16)
APAP SERPL-MCNC: <3 UG/ML (ref 10–20)
AST SERPL-CCNC: 17 U/L (ref 10–40)
BARBITURATES UR QL SCN>200 NG/ML: NEGATIVE
BASOPHILS # BLD AUTO: 0.05 K/UL (ref 0–0.2)
BASOPHILS NFR BLD: 0.7 % (ref 0–1.9)
BENZODIAZ UR QL SCN>200 NG/ML: NEGATIVE
BILIRUB SERPL-MCNC: 0.5 MG/DL (ref 0.1–1)
BILIRUB UR QL STRIP: ABNORMAL
BUN SERPL-MCNC: 24 MG/DL (ref 6–20)
BZE UR QL SCN: NEGATIVE
CALCIUM SERPL-MCNC: 9.7 MG/DL (ref 8.7–10.5)
CANNABINOIDS UR QL SCN: ABNORMAL
CHLORIDE SERPL-SCNC: 103 MMOL/L (ref 95–110)
CLARITY UR: CLEAR
CO2 SERPL-SCNC: 28 MMOL/L (ref 23–29)
COLOR UR: YELLOW
CREAT SERPL-MCNC: 0.9 MG/DL (ref 0.5–1.4)
CREAT UR-MCNC: 354.8 MG/DL (ref 23–375)
DIFFERENTIAL METHOD BLD: ABNORMAL
EOSINOPHIL # BLD AUTO: 0.2 K/UL (ref 0–0.5)
EOSINOPHIL NFR BLD: 2 % (ref 0–8)
ERYTHROCYTE [DISTWIDTH] IN BLOOD BY AUTOMATED COUNT: 13.2 % (ref 11.5–14.5)
EST. GFR  (NO RACE VARIABLE): >60 ML/MIN/1.73 M^2
ETHANOL SERPL-MCNC: <10 MG/DL
GLUCOSE SERPL-MCNC: 126 MG/DL (ref 70–110)
GLUCOSE UR QL STRIP: ABNORMAL
HCT VFR BLD AUTO: 43.8 % (ref 40–54)
HGB BLD-MCNC: 14.2 G/DL (ref 14–18)
HGB UR QL STRIP: NEGATIVE
IMM GRANULOCYTES # BLD AUTO: 0.04 K/UL (ref 0–0.04)
IMM GRANULOCYTES NFR BLD AUTO: 0.5 % (ref 0–0.5)
KETONES UR QL STRIP: ABNORMAL
LEUKOCYTE ESTERASE UR QL STRIP: NEGATIVE
LYMPHOCYTES # BLD AUTO: 1.3 K/UL (ref 1–4.8)
LYMPHOCYTES NFR BLD: 16.8 % (ref 18–48)
MCH RBC QN AUTO: 27.7 PG (ref 27–31)
MCHC RBC AUTO-ENTMCNC: 32.4 G/DL (ref 32–36)
MCV RBC AUTO: 85 FL (ref 82–98)
METHADONE UR QL SCN>300 NG/ML: NEGATIVE
MONOCYTES # BLD AUTO: 0.5 K/UL (ref 0.3–1)
MONOCYTES NFR BLD: 7.1 % (ref 4–15)
NEUTROPHILS # BLD AUTO: 5.5 K/UL (ref 1.8–7.7)
NEUTROPHILS NFR BLD: 72.9 % (ref 38–73)
NITRITE UR QL STRIP: NEGATIVE
NRBC BLD-RTO: 0 /100 WBC
OPIATES UR QL SCN: ABNORMAL
PCP UR QL SCN>25 NG/ML: NEGATIVE
PH UR STRIP: 6 [PH] (ref 5–8)
PLATELET # BLD AUTO: 279 K/UL (ref 150–450)
PMV BLD AUTO: 9.3 FL (ref 9.2–12.9)
POTASSIUM SERPL-SCNC: 3.6 MMOL/L (ref 3.5–5.1)
PROT SERPL-MCNC: 7.7 G/DL (ref 6–8.4)
PROT UR QL STRIP: NEGATIVE
RBC # BLD AUTO: 5.13 M/UL (ref 4.6–6.2)
SALICYLATES SERPL-MCNC: <5 MG/DL (ref 15–30)
SODIUM SERPL-SCNC: 140 MMOL/L (ref 136–145)
SP GR UR STRIP: >=1.03 (ref 1–1.03)
TOXICOLOGY INFORMATION: ABNORMAL
TSH SERPL DL<=0.005 MIU/L-ACNC: 2.74 UIU/ML (ref 0.4–4)
URN SPEC COLLECT METH UR: ABNORMAL
UROBILINOGEN UR STRIP-ACNC: NEGATIVE EU/DL
WBC # BLD AUTO: 7.56 K/UL (ref 3.9–12.7)

## 2024-02-19 PROCEDURE — 25000003 PHARM REV CODE 250: Performed by: STUDENT IN AN ORGANIZED HEALTH CARE EDUCATION/TRAINING PROGRAM

## 2024-02-19 PROCEDURE — 80143 DRUG ASSAY ACETAMINOPHEN: CPT | Performed by: SURGERY

## 2024-02-19 PROCEDURE — 80307 DRUG TEST PRSMV CHEM ANLYZR: CPT | Performed by: SURGERY

## 2024-02-19 PROCEDURE — 99285 EMERGENCY DEPT VISIT HI MDM: CPT

## 2024-02-19 PROCEDURE — 80179 DRUG ASSAY SALICYLATE: CPT | Performed by: SURGERY

## 2024-02-19 PROCEDURE — 84443 ASSAY THYROID STIM HORMONE: CPT | Performed by: SURGERY

## 2024-02-19 PROCEDURE — 11400000 HC PSYCH PRIVATE ROOM

## 2024-02-19 PROCEDURE — 81003 URINALYSIS AUTO W/O SCOPE: CPT | Mod: 59 | Performed by: SURGERY

## 2024-02-19 PROCEDURE — S4991 NICOTINE PATCH NONLEGEND: HCPCS | Performed by: STUDENT IN AN ORGANIZED HEALTH CARE EDUCATION/TRAINING PROGRAM

## 2024-02-19 PROCEDURE — 82077 ASSAY SPEC XCP UR&BREATH IA: CPT | Performed by: SURGERY

## 2024-02-19 PROCEDURE — 80053 COMPREHEN METABOLIC PANEL: CPT | Performed by: SURGERY

## 2024-02-19 PROCEDURE — 86703 HIV-1/HIV-2 1 RESULT ANTBDY: CPT | Performed by: PHYSICIAN ASSISTANT

## 2024-02-19 PROCEDURE — 85025 COMPLETE CBC W/AUTO DIFF WBC: CPT | Performed by: SURGERY

## 2024-02-19 RX ORDER — OLANZAPINE 10 MG/1
10 TABLET ORAL EVERY 8 HOURS PRN
Status: DISCONTINUED | OUTPATIENT
Start: 2024-02-19 | End: 2024-02-26 | Stop reason: HOSPADM

## 2024-02-19 RX ORDER — ONDANSETRON 4 MG/1
4 TABLET, ORALLY DISINTEGRATING ORAL EVERY 8 HOURS PRN
Status: DISCONTINUED | OUTPATIENT
Start: 2024-02-19 | End: 2024-02-26 | Stop reason: HOSPADM

## 2024-02-19 RX ORDER — DIPHENHYDRAMINE HYDROCHLORIDE 50 MG/ML
50 INJECTION INTRAMUSCULAR; INTRAVENOUS EVERY 4 HOURS PRN
Status: DISCONTINUED | OUTPATIENT
Start: 2024-02-19 | End: 2024-02-19 | Stop reason: HOSPADM

## 2024-02-19 RX ORDER — TALC
6 POWDER (GRAM) TOPICAL NIGHTLY PRN
Status: DISCONTINUED | OUTPATIENT
Start: 2024-02-19 | End: 2024-02-26 | Stop reason: HOSPADM

## 2024-02-19 RX ORDER — HALOPERIDOL 5 MG/ML
5 INJECTION INTRAMUSCULAR EVERY 4 HOURS PRN
Status: DISCONTINUED | OUTPATIENT
Start: 2024-02-19 | End: 2024-02-19 | Stop reason: HOSPADM

## 2024-02-19 RX ORDER — ACETAMINOPHEN 325 MG/1
650 TABLET ORAL EVERY 6 HOURS PRN
Status: DISCONTINUED | OUTPATIENT
Start: 2024-02-19 | End: 2024-02-26 | Stop reason: HOSPADM

## 2024-02-19 RX ORDER — IBUPROFEN 200 MG
1 TABLET ORAL DAILY
Status: DISCONTINUED | OUTPATIENT
Start: 2024-02-19 | End: 2024-02-26 | Stop reason: HOSPADM

## 2024-02-19 RX ORDER — THIAMINE HCL 100 MG
100 TABLET ORAL DAILY
Status: DISCONTINUED | OUTPATIENT
Start: 2024-02-19 | End: 2024-02-26 | Stop reason: HOSPADM

## 2024-02-19 RX ORDER — FOLIC ACID 1 MG/1
1 TABLET ORAL DAILY
Status: DISCONTINUED | OUTPATIENT
Start: 2024-02-19 | End: 2024-02-26 | Stop reason: HOSPADM

## 2024-02-19 RX ORDER — LORAZEPAM 2 MG/ML
2 INJECTION INTRAMUSCULAR EVERY 4 HOURS PRN
Status: DISCONTINUED | OUTPATIENT
Start: 2024-02-19 | End: 2024-02-19 | Stop reason: HOSPADM

## 2024-02-19 RX ORDER — ALUMINUM HYDROXIDE, MAGNESIUM HYDROXIDE, AND SIMETHICONE 1200; 120; 1200 MG/30ML; MG/30ML; MG/30ML
30 SUSPENSION ORAL
Status: DISCONTINUED | OUTPATIENT
Start: 2024-02-19 | End: 2024-02-26 | Stop reason: HOSPADM

## 2024-02-19 RX ORDER — OLANZAPINE 10 MG/2ML
10 INJECTION, POWDER, FOR SOLUTION INTRAMUSCULAR EVERY 8 HOURS PRN
Status: DISCONTINUED | OUTPATIENT
Start: 2024-02-19 | End: 2024-02-26 | Stop reason: HOSPADM

## 2024-02-19 RX ORDER — HYDROXYZINE HYDROCHLORIDE 25 MG/1
50 TABLET, FILM COATED ORAL 3 TIMES DAILY PRN
Status: DISCONTINUED | OUTPATIENT
Start: 2024-02-19 | End: 2024-02-26 | Stop reason: HOSPADM

## 2024-02-19 RX ORDER — LOPERAMIDE HYDROCHLORIDE 2 MG/1
2 CAPSULE ORAL
Status: DISCONTINUED | OUTPATIENT
Start: 2024-02-19 | End: 2024-02-26 | Stop reason: HOSPADM

## 2024-02-19 RX ORDER — IBUPROFEN 200 MG
1 TABLET ORAL DAILY PRN
Status: DISCONTINUED | OUTPATIENT
Start: 2024-02-19 | End: 2024-02-19

## 2024-02-19 RX ADMIN — Medication 100 MG: at 01:02

## 2024-02-19 RX ADMIN — Medication 6 MG: at 07:02

## 2024-02-19 RX ADMIN — HYDROXYZINE HYDROCHLORIDE 50 MG: 25 TABLET, FILM COATED ORAL at 07:02

## 2024-02-19 RX ADMIN — NICOTINE 1 PATCH: 21 PATCH, EXTENDED RELEASE TRANSDERMAL at 01:02

## 2024-02-19 RX ADMIN — THERA TABS 1 TABLET: TAB at 01:02

## 2024-02-19 RX ADMIN — FOLIC ACID 1 MG: 1 TABLET ORAL at 01:02

## 2024-02-19 NOTE — ED PROVIDER NOTES
"Encounter Date: 2/19/2024       History     Chief Complaint   Patient presents with    Addiction Problem     Pt arrives to the ed with c/o "I'm coming off heroin I did this morning and I want to detox from it. Yall always help me when I need to detox." Denies any withdrawal symptoms. Last use of heroin was this morning. Denies si/hi and hallucinations.     History of Present Illness  Ismael Gordon Jr. is a 33 y.o. male that presents with anxiety & depression  Patient was an active heroin abuser, last use was this morning on ER interview  Patient has been to the psychiatric unit several times with depression & anxiety  Patient is also asking for evaluation for drug addiction treatment this morning  Patient denies any suicidal ideation but clearly has chronic drug addiction HX  Additionally, the patient has an unmedicated bipolar patient, not psychotic now    The history is provided by the patient.     Review of patient's allergies indicates:  No Known Allergies    Past Medical History:   Diagnosis Date    Bipolar 1 disorder     Depression     Hepatitis C     History of psychiatric hospitalization     "It has all been to get to previous 28 day programs."    Hx of psychiatric care     Psychiatric exam requested by authority     Psychiatric problem     Suicide attempt 01/16/2017    'I did an overdose on heroin so you could say passive suicide in my opinion."    Therapy      Past Surgical History:   Procedure Laterality Date    CHOLECYSTECTOMY       Family History   Problem Relation Age of Onset    Alcohol abuse Mother     No Known Problems Father     No Known Problems Sister     Drug abuse Brother     Anxiety disorder Maternal Aunt     Drug abuse Maternal Aunt     Drug abuse Paternal Aunt     Drug abuse Maternal Uncle     Anxiety disorder Maternal Uncle     Drug abuse Paternal Uncle     No Known Problems Maternal Grandfather     No Known Problems Maternal Grandmother     No Known Problems Paternal Grandfather     " "No Known Problems Paternal Grandmother     Drug abuse Cousin     Anxiety disorder Cousin     Alcohol abuse Cousin     No Known Problems Maternal Aunt     Drug abuse Maternal Aunt     No Known Problems Paternal Cousin      Social History     Tobacco Use    Smoking status: Every Day     Current packs/day: 0.25     Average packs/day: 0.3 packs/day for 15.1 years (3.8 ttl pk-yrs)     Types: Cigarettes     Start date: 2009    Smokeless tobacco: Current    Tobacco comments:     "By default I have quit because I can no longer afford it."    Substance Use Topics    Alcohol use: No    Drug use: Yes     Types: Methamphetamines, Marijuana     Comment: Feb 2015 heroin "but very rarely but when I do I blow out."     Review of Systems   Constitutional:  Negative for activity change, appetite change, fatigue, fever and unexpected weight change.   HENT:  Negative for congestion, ear pain, mouth sores, nosebleeds, rhinorrhea, sinus pressure, sneezing and sore throat.    Eyes:  Negative for pain, discharge, redness and itching.   Respiratory:  Negative for apnea, cough, chest tightness and shortness of breath.    Cardiovascular:  Negative for chest pain, palpitations and leg swelling.   Gastrointestinal:  Negative for abdominal distention, abdominal pain, anal bleeding, constipation, diarrhea, nausea and vomiting.   Endocrine: Negative.    Genitourinary:  Negative for dysuria, enuresis, flank pain and frequency.   Musculoskeletal:  Negative for arthralgias, back pain, neck pain and neck stiffness.   Skin:  Negative for color change and wound.   Allergic/Immunologic: Negative.    Neurological:  Negative for dizziness, tremors, syncope, facial asymmetry, speech difficulty, weakness, light-headedness, numbness and headaches.   Hematological:  Negative for adenopathy. Does not bruise/bleed easily.   Psychiatric/Behavioral:  Positive for dysphoric mood. Negative for agitation, behavioral problems, hallucinations, self-injury and suicidal " ideas. The patient is nervous/anxious.        Physical Exam     Initial Vitals [02/19/24 0813]   BP Pulse Resp Temp SpO2   (!) 139/96 96 20 98.3 °F (36.8 °C) 99 %      MAP       --         Physical Exam    Nursing note and vitals reviewed.  Constitutional: Vital signs are normal. He appears well-developed and well-nourished. He is cooperative.   HENT:   Head: Normocephalic and atraumatic.   Mouth/Throat: Uvula is midline and mucous membranes are normal.   Eyes: Conjunctivae, EOM and lids are normal. Pupils are equal, round, and reactive to light.   Neck: Neck supple.   Normal range of motion.   Full passive range of motion without pain.     Cardiovascular:  Normal rate, regular rhythm, S1 normal, S2 normal, normal heart sounds, intact distal pulses and normal pulses.           Pulmonary/Chest: Effort normal and breath sounds normal.   Abdominal: Abdomen is soft and flat. Bowel sounds are normal.   Musculoskeletal:         General: Normal range of motion.      Cervical back: Full passive range of motion without pain, normal range of motion and neck supple.     Neurological: He is alert and oriented to person, place, and time. He has normal strength.   Skin: Skin is warm, dry and intact. Capillary refill takes less than 2 seconds.         ED Course   Procedures  Labs Reviewed   CBC W/ AUTO DIFFERENTIAL   DRUG SCREEN PANEL, URINE EMERGENCY   TSH   URINALYSIS, REFLEX TO URINE CULTURE   SALICYLATE LEVEL   ACETAMINOPHEN LEVEL   ALCOHOL,MEDICAL (ETHANOL)   COMPREHENSIVE METABOLIC PANEL          Imaging Results    None          Medications   haloperidol lactate injection 5 mg (has no administration in time range)   LORazepam injection 2 mg (has no administration in time range)   diphenhydrAMINE injection 50 mg (has no administration in time range)     Medical Decision Making  33-year-old male with drug addiction issues with anxiety & depression noted   Differential includes suicidal ideation, homicidal ideation,  polysubstance abuse    Problems Addressed:  Anxiety: complicated acute illness or injury  Depression, unspecified depression type: complicated acute illness or injury  Heroin addiction: complicated acute illness or injury  Medical clearance for psychiatric admission: complicated acute illness or injury  Polysubstance abuse: complicated acute illness or injury  Stress: complicated acute illness or injury    Amount and/or Complexity of Data Reviewed  Labs: ordered. Decision-making details documented in ED Course.    ED Management & Risks of Complication, Morbidity, & Mortality:  Medically cleared for psychiatry placement: 2/19/2024  8:15 AM    Clinical Impression:  Final diagnoses:  [F19.10] Polysubstance abuse (Primary)  [F32.A] Depression, unspecified depression type  [F11.20] Heroin addiction  [Z00.8] Medical clearance for psychiatric admission  [F41.9] Anxiety  [F43.9] Stress          ED Disposition Condition    Transfer to Psych Facility Darin Duarte MD  02/19/24 0801

## 2024-02-19 NOTE — ED NOTES
Pt states that he would like to detox and doesn't want to do it on his own.   Used heroin this morning, meth last night, and benzos two days ago.   ARTHUR

## 2024-02-19 NOTE — PLAN OF CARE
Admit Note:  Pt received from [facility] jacob LEHMAN'aakash by Dr Fan.  Pt escorted to [facility] by security and techs via wheelchair.  Pt wanded and ambulatory on unit.  Pt VSS.  Pt calm and cooperative during admission.  States he is here to detox off of heroin.  States he wishes to get on the vivitrol shot, either here or once he leaves here at his follow-up appointment.  Pt reports he last used heroin this morning.  States he was using meth and benzos as supplementary drugs when he would be out of heroin.  Pt does not want inpatient rehab, just to get on vivitrol shot.  Pt also request STD bloodwork while he is here.  Pt denies any S/I or H/I at this time.  Mood is good.  Pt states he feels good, doesn't expect to start having withdrawal symptoms until probably tomorrow.  Pt singed all paperwork.  Oriented to unit.  Explained inner workings of unit to pt and instructed to inform staff of any needs or concerns, understanding verbalized.  Will continue to monitor.

## 2024-02-20 PROBLEM — F19.90 POLYSUBSTANCE USE DISORDER: Status: ACTIVE | Noted: 2024-02-20

## 2024-02-20 PROCEDURE — 99223 1ST HOSP IP/OBS HIGH 75: CPT | Mod: ,,, | Performed by: STUDENT IN AN ORGANIZED HEALTH CARE EDUCATION/TRAINING PROGRAM

## 2024-02-20 PROCEDURE — 99231 SBSQ HOSP IP/OBS SF/LOW 25: CPT | Mod: ,,, | Performed by: PHYSICIAN ASSISTANT

## 2024-02-20 PROCEDURE — 63600175 PHARM REV CODE 636 W HCPCS: Mod: JZ,TB | Performed by: STUDENT IN AN ORGANIZED HEALTH CARE EDUCATION/TRAINING PROGRAM

## 2024-02-20 PROCEDURE — S4991 NICOTINE PATCH NONLEGEND: HCPCS | Performed by: STUDENT IN AN ORGANIZED HEALTH CARE EDUCATION/TRAINING PROGRAM

## 2024-02-20 PROCEDURE — 11400000 HC PSYCH PRIVATE ROOM

## 2024-02-20 PROCEDURE — 25000003 PHARM REV CODE 250: Performed by: STUDENT IN AN ORGANIZED HEALTH CARE EDUCATION/TRAINING PROGRAM

## 2024-02-20 RX ORDER — DIAZEPAM 10 MG/1
10 TABLET ORAL 3 TIMES DAILY
Status: DISCONTINUED | OUTPATIENT
Start: 2024-02-20 | End: 2024-02-22

## 2024-02-20 RX ORDER — MIRTAZAPINE 7.5 MG/1
7.5 TABLET, FILM COATED ORAL NIGHTLY
Status: DISCONTINUED | OUTPATIENT
Start: 2024-02-20 | End: 2024-02-21

## 2024-02-20 RX ORDER — IBUPROFEN 800 MG/1
800 TABLET ORAL EVERY 8 HOURS PRN
Status: DISCONTINUED | OUTPATIENT
Start: 2024-02-20 | End: 2024-02-26 | Stop reason: HOSPADM

## 2024-02-20 RX ORDER — DIAZEPAM 10 MG/1
10 TABLET ORAL ONCE
Status: COMPLETED | OUTPATIENT
Start: 2024-02-20 | End: 2024-02-20

## 2024-02-20 RX ORDER — METHOCARBAMOL 500 MG/1
500 TABLET, FILM COATED ORAL EVERY 8 HOURS PRN
Status: DISCONTINUED | OUTPATIENT
Start: 2024-02-20 | End: 2024-02-26 | Stop reason: HOSPADM

## 2024-02-20 RX ORDER — DICYCLOMINE HYDROCHLORIDE 10 MG/1
10 CAPSULE ORAL EVERY 6 HOURS PRN
Status: DISCONTINUED | OUTPATIENT
Start: 2024-02-20 | End: 2024-02-26 | Stop reason: HOSPADM

## 2024-02-20 RX ADMIN — FOLIC ACID 1 MG: 1 TABLET ORAL at 08:02

## 2024-02-20 RX ADMIN — ALUMINUM HYDROXIDE, MAGNESIUM HYDROXIDE, AND SIMETHICONE 30 ML: 1200; 120; 1200 SUSPENSION ORAL at 11:02

## 2024-02-20 RX ADMIN — DIAZEPAM 10 MG: 10 TABLET ORAL at 08:02

## 2024-02-20 RX ADMIN — THERA TABS 1 TABLET: TAB at 08:02

## 2024-02-20 RX ADMIN — OLANZAPINE 10 MG: 10 INJECTION, POWDER, FOR SOLUTION INTRAMUSCULAR at 10:02

## 2024-02-20 RX ADMIN — IBUPROFEN 800 MG: 800 TABLET, FILM COATED ORAL at 11:02

## 2024-02-20 RX ADMIN — OLANZAPINE 10 MG: 10 TABLET, FILM COATED ORAL at 06:02

## 2024-02-20 RX ADMIN — Medication 100 MG: at 08:02

## 2024-02-20 RX ADMIN — METHOCARBAMOL TABLETS 500 MG: 500 TABLET, COATED ORAL at 02:02

## 2024-02-20 RX ADMIN — DIAZEPAM 10 MG: 10 TABLET ORAL at 02:02

## 2024-02-20 RX ADMIN — DICYCLOMINE HYDROCHLORIDE 10 MG: 10 CAPSULE ORAL at 06:02

## 2024-02-20 RX ADMIN — MIRTAZAPINE 7.5 MG: 7.5 TABLET, FILM COATED ORAL at 08:02

## 2024-02-20 RX ADMIN — HYDROXYZINE HYDROCHLORIDE 50 MG: 25 TABLET, FILM COATED ORAL at 05:02

## 2024-02-20 RX ADMIN — DIAZEPAM 10 MG: 10 TABLET ORAL at 11:02

## 2024-02-20 RX ADMIN — METHOCARBAMOL TABLETS 500 MG: 500 TABLET, COATED ORAL at 11:02

## 2024-02-20 RX ADMIN — ACETAMINOPHEN 650 MG: 325 TABLET ORAL at 07:02

## 2024-02-20 RX ADMIN — ACETAMINOPHEN 650 MG: 325 TABLET ORAL at 05:02

## 2024-02-20 RX ADMIN — NICOTINE 1 PATCH: 21 PATCH, EXTENDED RELEASE TRANSDERMAL at 08:02

## 2024-02-20 NOTE — CONSULTS
"[facility] Medicine  Consult Note    Patient Name: Ismael Gordon Jr.  MRN: [id number]  Admission Date: 2/19/2024  Hospital Length of Stay: 1 days  Attending Physician: Manolo Mendoza III, MD   Primary Care Provider: Sha[facility] Primary Doctor           Patient information was obtained from ER records.     Inpatient consult to [facility] for History and Physical  Consult performed by: Rachana Jeffers PA-C  Consult ordered by: Manolo Mendoza III, MD        Subjective:     Principal Problem: <principal problem not specified>    Chief Complaint: No chief complaint on file.       HPI: Patient is a 33 year old male with polysubstance use disorder (active heroine use), anxiety and depression who presented to the ED with depression.  Hospital medicine consulted for medical management.               Past Medical History:   Diagnosis Date    Bipolar 1 disorder     Depression     Hepatitis C     History of psychiatric hospitalization     "It has all been to get to previous 28 day programs."    Hx of psychiatric care     Psychiatric exam requested by authority     Psychiatric problem     Suicide attempt 01/16/2017    'I did an overdose on heroin so you could say passive suicide in my opinion."    Therapy        Past Surgical History:   Procedure Laterality Date    CHOLECYSTECTOMY         Review of patient's allergies indicates:  No Known Allergies    No current facility-administered medications on file prior to encounter.     Current Outpatient Medications on File Prior to Encounter   Medication Sig    hydrOXYzine pamoate (VISTARIL) 50 MG Cap Take 1 capsule (50 mg total) by mouth every evening.    mirtazapine (REMERON) 15 MG tablet Take 1 tablet (15 mg total) by mouth every evening.    nicotine (NICODERM CQ) 14 mg/24 hr Place 1 patch onto the skin once daily.     Family History       Problem Relation (Age of Onset)    Alcohol abuse Mother, Cousin    Anxiety disorder Maternal Aunt, Maternal " "Uncle, Cousin    Drug abuse Brother, Maternal Aunt, Paternal Aunt, Maternal Uncle, Paternal Uncle, Cousin, Maternal Aunt    No Known Problems Father, Sister, Maternal Grandfather, Maternal Grandmother, Paternal Grandfather, Paternal Grandmother, Maternal Aunt, Paternal Cousin          Tobacco Use    Smoking status: Every Day     Current packs/day: 0.25     Average packs/day: 0.3 packs/day for 15.1 years (3.8 ttl pk-yrs)     Types: Cigarettes     Start date: 2009    Smokeless tobacco: Current    Tobacco comments:     "By default I have quit because I can no longer afford it."    Substance and Sexual Activity    Alcohol use: No    Drug use: Yes     Types: Methamphetamines, Marijuana     Comment: Feb 2015 heroin "but very rarely but when I do I blow out."    Sexual activity: Yes     Partners: Female     Birth control/protection: None     Review of Systems   Constitutional:  Negative for chills and fever.   HENT:  Negative for ear discharge and ear pain.    Eyes:  Negative for pain and discharge.   Respiratory:  Negative for cough, shortness of breath and wheezing.    Cardiovascular:  Negative for chest pain, palpitations and leg swelling.   Gastrointestinal:  Negative for abdominal distention, constipation, diarrhea, nausea and vomiting.   Endocrine: Negative for cold intolerance and heat intolerance.   Genitourinary:  Negative for difficulty urinating and dysuria.   Musculoskeletal:  Negative for joint swelling and myalgias.   Skin:  Negative for rash and wound.   Neurological:  Negative for dizziness and headaches.   Psychiatric/Behavioral:  Negative for agitation and confusion.      Objective:     Vital Signs (Most Recent):  Temp: 97.9 °F (36.6 °C) (02/20/24 0738)  Pulse: 71 (02/20/24 0738)  Resp: 18 (02/20/24 0738)  BP: (!) 114/58 (02/20/24 0738) Vital Signs (24h Range):  Temp:  [96.2 °F (35.7 °C)-97.9 °F (36.6 °C)] 97.9 °F (36.6 °C)  Pulse:  [71-74] 71  Resp:  [18-20] 18  BP: (114-115)/(58-63) 114/58     Weight: " 75.2 kg (165 lb 12.6 oz)  Body mass index is 23.79 kg/m².     Physical Exam  Constitutional:       Appearance: Normal appearance.   HENT:      Head: Normocephalic and atraumatic.   Cardiovascular:      Rate and Rhythm: Normal rate and regular rhythm.   Pulmonary:      Effort: Pulmonary effort is normal.      Breath sounds: Normal breath sounds.   Abdominal:      General: There is no distension.      Tenderness: There is no abdominal tenderness.   Musculoskeletal:         General: No swelling or tenderness.      Cervical back: Neck supple.      Right lower leg: No edema.      Left lower leg: No edema.   Skin:     General: Skin is warm and dry.   Neurological:      Mental Status: He is alert.      Cranial Nerves: Cranial nerves 2-12 are intact.      Comments: CN II: Visual Fields full to confrontation  CN III, IV , VI PERRL   CN III: no palsy   Nystagmus: none  Diplopia: none  Ophthalmoparesis: none  CN V Facial sensation intact  CN VII facial expression full, symmetric  CN VIII normal  CN IX normal  CN X normal  CN XI normal  CN XII normal           Significant Labs: UPT  No results found for this or any previous visit.  U/A  Results for orders placed or performed during the hospital encounter of 05/13/17   Urinalysis - clean catch   Result Value Ref Range    Specimen UA Urine, Clean Catch     Color, UA Yellow Yellow, Straw, Kelly    Appearance, UA Clear Clear    pH, UA 6.0 5.0 - 8.0    Specific Gravity, UA 1.015 1.005 - 1.030    Protein, UA Negative Negative    Glucose, UA Negative Negative    Ketones, UA Negative Negative    Bilirubin (UA) Negative Negative    Occult Blood UA 1+ (A) Negative    Nitrite, UA Negative Negative    Urobilinogen, UA Negative <2.0 EU/dL    Leukocytes, UA Negative Negative     UDS  Results for orders placed or performed during the hospital encounter of 02/19/24   Drug screen panel, in-house   Result Value Ref Range    Benzodiazepines Negative Negative    Methadone metabolites Negative  Negative    Cocaine (Metab.) Negative Negative    Opiate Scrn, Ur Presumptive Positive (A) Negative    Barbiturate Screen, Ur Negative Negative    Amphetamine Screen, Ur Presumptive Positive (A) Negative    THC Presumptive Positive (A) Negative    Phencyclidine Negative Negative    Creatinine, Urine 354.8 23.0 - 375.0 mg/dL    Toxicology Information SEE COMMENT      CBC  Results for orders placed or performed during the hospital encounter of 02/19/24   CBC Auto Differential   Result Value Ref Range    WBC 7.56 3.90 - 12.70 K/uL    RBC 5.13 4.60 - 6.20 M/uL    Hemoglobin 14.2 14.0 - 18.0 g/dL    Hematocrit 43.8 40.0 - 54.0 %    MCV 85 82 - 98 fL    MCH 27.7 27.0 - 31.0 pg    MCHC 32.4 32.0 - 36.0 g/dL    RDW 13.2 11.5 - 14.5 %    Platelets 279 150 - 450 K/uL    MPV 9.3 9.2 - 12.9 fL    Immature Granulocytes 0.5 0.0 - 0.5 %    Gran # (ANC) 5.5 1.8 - 7.7 K/uL    Immature Grans (Abs) 0.04 0.00 - 0.04 K/uL    Lymph # 1.3 1.0 - 4.8 K/uL    Mono # 0.5 0.3 - 1.0 K/uL    Eos # 0.2 0.0 - 0.5 K/uL    Baso # 0.05 0.00 - 0.20 K/uL    nRBC 0 0 /100 WBC    Gran % 72.9 38.0 - 73.0 %    Lymph % 16.8 (L) 18.0 - 48.0 %    Mono % 7.1 4.0 - 15.0 %    Eosinophil % 2.0 0.0 - 8.0 %    Basophil % 0.7 0.0 - 1.9 %    Differential Method Automated      CMP  Results for orders placed or performed during the hospital encounter of 02/19/24   Comprehensive Metabolic Panel   Result Value Ref Range    Sodium 140 136 - 145 mmol/L    Potassium 3.6 3.5 - 5.1 mmol/L    Chloride 103 95 - 110 mmol/L    CO2 28 23 - 29 mmol/L    Glucose 126 (H) 70 - 110 mg/dL    BUN 24 (H) 6 - 20 mg/dL    Creatinine 0.9 0.5 - 1.4 mg/dL    Calcium 9.7 8.7 - 10.5 mg/dL    Total Protein 7.7 6.0 - 8.4 g/dL    Albumin 4.1 3.5 - 5.2 g/dL    Total Bilirubin 0.5 0.1 - 1.0 mg/dL    Alkaline Phosphatase 68 55 - 135 U/L    AST 17 10 - 40 U/L    ALT 22 10 - 44 U/L    eGFR >60 >60 mL/min/1.73 m^2    Anion Gap 9 8 - 16 mmol/L     TSH  Results for orders placed or performed during the  hospital encounter of 02/19/24   TSH   Result Value Ref Range    TSH 2.738 0.400 - 4.000 uIU/mL     ETOH  Results for orders placed or performed during the hospital encounter of 02/19/24   Ethanol   Result Value Ref Range    Alcohol, Serum <10 <10 mg/dL     Salicylate  Results for orders placed or performed during the hospital encounter of 02/19/24   Salicylate Level   Result Value Ref Range    Salicylate Lvl <5.0 (L) 15.0 - 30.0 mg/dL     Acetaminophen  Results for orders placed or performed during the hospital encounter of 02/19/24   Acetaminophen Level   Result Value Ref Range    Acetaminophen (Tylenol), Serum <3.0 (L) 10.0 - 20.0 ug/mL             Significant Imaging: none  Assessment/Plan:     Polysubstance use disorder  Meth, THC and opioid on UDS  Psych for cessation management  Pt requesting STD testing but denies symptoms or high risk sexual activity.          VTE Risk Mitigation (From admission, onward)      None                Thank you for your consult. I will sign off. Please contact us if you have any additional questions.    Rachana Jeffers PA-C  Department of Hospital Medicine   [facility]

## 2024-02-20 NOTE — PSYCH
Pt agitated saying he needs to get out of here.  Ran in the nurses station behind a med student, trying to get out pulling on doors.  Security called and pt ran back to his room.  Talked with pt, he said he was sorry he just felt like he needed to leave.  He is detoxing.  Pt given IM zyprexa at 1045, pt tolerated well.  Pt met with , will start benzo taper.  Pt restless, will give new orders when available.  Pt repeatedly apologized for his behavior.

## 2024-02-20 NOTE — PROGRESS NOTES
02/20/24 1145   [facility] Group Therapy   Group Name Other   Participation Level None     The RN advised the CTRS to allow the patient to rest in bed at this time. Patient is detoxing and was given medication.

## 2024-02-20 NOTE — PROGRESS NOTES
02/20/24 1532   [facility] Group Therapy   Group Name Other  (1:1 attempt)   Participation Level None   Participation Quality Sleeping     Patient is detoxing, was given medication and is resting in assigned room, and did not attend group.

## 2024-02-20 NOTE — SUBJECTIVE & OBJECTIVE
"Past Medical History:   Diagnosis Date    Bipolar 1 disorder     Depression     Hepatitis C     History of psychiatric hospitalization     "It has all been to get to previous 28 day programs."    Hx of psychiatric care     Psychiatric exam requested by authority     Psychiatric problem     Suicide attempt 01/16/2017    'I did an overdose on heroin so you could say passive suicide in my opinion."    Therapy        Past Surgical History:   Procedure Laterality Date    CHOLECYSTECTOMY         Review of patient's allergies indicates:  No Known Allergies    No current facility-administered medications on file prior to encounter.     Current Outpatient Medications on File Prior to Encounter   Medication Sig    hydrOXYzine pamoate (VISTARIL) 50 MG Cap Take 1 capsule (50 mg total) by mouth every evening.    mirtazapine (REMERON) 15 MG tablet Take 1 tablet (15 mg total) by mouth every evening.    nicotine (NICODERM CQ) 14 mg/24 hr Place 1 patch onto the skin once daily.     Family History       Problem Relation (Age of Onset)    Alcohol abuse Mother, Cousin    Anxiety disorder Maternal Aunt, Maternal Uncle, Cousin    Drug abuse Brother, Maternal Aunt, Paternal Aunt, Maternal Uncle, Paternal Uncle, Cousin, Maternal Aunt    No Known Problems Father, Sister, Maternal Grandfather, Maternal Grandmother, Paternal Grandfather, Paternal Grandmother, Maternal Aunt, Paternal Cousin          Tobacco Use    Smoking status: Every Day     Current packs/day: 0.25     Average packs/day: 0.3 packs/day for 15.1 years (3.8 ttl pk-yrs)     Types: Cigarettes     Start date: 2009    Smokeless tobacco: Current    Tobacco comments:     "By default I have quit because I can no longer afford it."    Substance and Sexual Activity    Alcohol use: No    Drug use: Yes     Types: Methamphetamines, Marijuana     Comment: Feb 2015 heroin "but very rarely but when I do I blow out."    Sexual activity: Yes     Partners: Female     Birth control/protection: " None     Review of Systems   Constitutional:  Negative for chills and fever.   HENT:  Negative for ear discharge and ear pain.    Eyes:  Negative for pain and discharge.   Respiratory:  Negative for cough, shortness of breath and wheezing.    Cardiovascular:  Negative for chest pain, palpitations and leg swelling.   Gastrointestinal:  Negative for abdominal distention, constipation, diarrhea, nausea and vomiting.   Endocrine: Negative for cold intolerance and heat intolerance.   Genitourinary:  Negative for difficulty urinating and dysuria.   Musculoskeletal:  Negative for joint swelling and myalgias.   Skin:  Negative for rash and wound.   Neurological:  Negative for dizziness and headaches.   Psychiatric/Behavioral:  Negative for agitation and confusion.      Objective:     Vital Signs (Most Recent):  Temp: 97.9 °F (36.6 °C) (02/20/24 0738)  Pulse: 71 (02/20/24 0738)  Resp: 18 (02/20/24 0738)  BP: (!) 114/58 (02/20/24 0738) Vital Signs (24h Range):  Temp:  [96.2 °F (35.7 °C)-97.9 °F (36.6 °C)] 97.9 °F (36.6 °C)  Pulse:  [71-74] 71  Resp:  [18-20] 18  BP: (114-115)/(58-63) 114/58     Weight: 75.2 kg (165 lb 12.6 oz)  Body mass index is 23.79 kg/m².     Physical Exam  Constitutional:       Appearance: Normal appearance.   HENT:      Head: Normocephalic and atraumatic.   Cardiovascular:      Rate and Rhythm: Normal rate and regular rhythm.   Pulmonary:      Effort: Pulmonary effort is normal.      Breath sounds: Normal breath sounds.   Abdominal:      General: There is no distension.      Tenderness: There is no abdominal tenderness.   Musculoskeletal:         General: No swelling or tenderness.      Cervical back: Neck supple.      Right lower leg: No edema.      Left lower leg: No edema.   Skin:     General: Skin is warm and dry.   Neurological:      Mental Status: He is alert.      Cranial Nerves: Cranial nerves 2-12 are intact.      Comments: CN II: Visual Fields full to confrontation  CN III, IV , VI PERRL   CN  III: no palsy   Nystagmus: none  Diplopia: none  Ophthalmoparesis: none  CN V Facial sensation intact  CN VII facial expression full, symmetric  CN VIII normal  CN IX normal  CN X normal  CN XI normal  CN XII normal           Significant Labs: UPT  No results found for this or any previous visit.  U/A  Results for orders placed or performed during the hospital encounter of 05/13/17   Urinalysis - clean catch   Result Value Ref Range    Specimen UA Urine, Clean Catch     Color, UA Yellow Yellow, Straw, Kelly    Appearance, UA Clear Clear    pH, UA 6.0 5.0 - 8.0    Specific Gravity, UA 1.015 1.005 - 1.030    Protein, UA Negative Negative    Glucose, UA Negative Negative    Ketones, UA Negative Negative    Bilirubin (UA) Negative Negative    Occult Blood UA 1+ (A) Negative    Nitrite, UA Negative Negative    Urobilinogen, UA Negative <2.0 EU/dL    Leukocytes, UA Negative Negative     UDS  Results for orders placed or performed during the hospital encounter of 02/19/24   Drug screen panel, in-house   Result Value Ref Range    Benzodiazepines Negative Negative    Methadone metabolites Negative Negative    Cocaine (Metab.) Negative Negative    Opiate Scrn, Ur Presumptive Positive (A) Negative    Barbiturate Screen, Ur Negative Negative    Amphetamine Screen, Ur Presumptive Positive (A) Negative    THC Presumptive Positive (A) Negative    Phencyclidine Negative Negative    Creatinine, Urine 354.8 23.0 - 375.0 mg/dL    Toxicology Information SEE COMMENT      CBC  Results for orders placed or performed during the hospital encounter of 02/19/24   CBC Auto Differential   Result Value Ref Range    WBC 7.56 3.90 - 12.70 K/uL    RBC 5.13 4.60 - 6.20 M/uL    Hemoglobin 14.2 14.0 - 18.0 g/dL    Hematocrit 43.8 40.0 - 54.0 %    MCV 85 82 - 98 fL    MCH 27.7 27.0 - 31.0 pg    MCHC 32.4 32.0 - 36.0 g/dL    RDW 13.2 11.5 - 14.5 %    Platelets 279 150 - 450 K/uL    MPV 9.3 9.2 - 12.9 fL    Immature Granulocytes 0.5 0.0 - 0.5 %    Gran #  (ANC) 5.5 1.8 - 7.7 K/uL    Immature Grans (Abs) 0.04 0.00 - 0.04 K/uL    Lymph # 1.3 1.0 - 4.8 K/uL    Mono # 0.5 0.3 - 1.0 K/uL    Eos # 0.2 0.0 - 0.5 K/uL    Baso # 0.05 0.00 - 0.20 K/uL    nRBC 0 0 /100 WBC    Gran % 72.9 38.0 - 73.0 %    Lymph % 16.8 (L) 18.0 - 48.0 %    Mono % 7.1 4.0 - 15.0 %    Eosinophil % 2.0 0.0 - 8.0 %    Basophil % 0.7 0.0 - 1.9 %    Differential Method Automated      CMP  Results for orders placed or performed during the hospital encounter of 02/19/24   Comprehensive Metabolic Panel   Result Value Ref Range    Sodium 140 136 - 145 mmol/L    Potassium 3.6 3.5 - 5.1 mmol/L    Chloride 103 95 - 110 mmol/L    CO2 28 23 - 29 mmol/L    Glucose 126 (H) 70 - 110 mg/dL    BUN 24 (H) 6 - 20 mg/dL    Creatinine 0.9 0.5 - 1.4 mg/dL    Calcium 9.7 8.7 - 10.5 mg/dL    Total Protein 7.7 6.0 - 8.4 g/dL    Albumin 4.1 3.5 - 5.2 g/dL    Total Bilirubin 0.5 0.1 - 1.0 mg/dL    Alkaline Phosphatase 68 55 - 135 U/L    AST 17 10 - 40 U/L    ALT 22 10 - 44 U/L    eGFR >60 >60 mL/min/1.73 m^2    Anion Gap 9 8 - 16 mmol/L     TSH  Results for orders placed or performed during the hospital encounter of 02/19/24   TSH   Result Value Ref Range    TSH 2.738 0.400 - 4.000 uIU/mL     ETOH  Results for orders placed or performed during the hospital encounter of 02/19/24   Ethanol   Result Value Ref Range    Alcohol, Serum <10 <10 mg/dL     Salicylate  Results for orders placed or performed during the hospital encounter of 02/19/24   Salicylate Level   Result Value Ref Range    Salicylate Lvl <5.0 (L) 15.0 - 30.0 mg/dL     Acetaminophen  Results for orders placed or performed during the hospital encounter of 02/19/24   Acetaminophen Level   Result Value Ref Range    Acetaminophen (Tylenol), Serum <3.0 (L) 10.0 - 20.0 ug/mL             Significant Imaging: none

## 2024-02-20 NOTE — NURSING
Pt given prn motrin and scheduled valium for detox at 1142.  Pt reports using IV heroin about 600-700 dollars worth every week.  His detox symptoms are severe.

## 2024-02-20 NOTE — PLAN OF CARE
Pt cooperative, anxious at times, restless, denies SI at this time, appetite good, avoiding social contact, isolating to room, up for meals and snacks, pt given poweraide to drink safety precautions maintained will continue to monitor   04-Nov-2019 07:23

## 2024-02-20 NOTE — ASSESSMENT & PLAN NOTE
Meth, THC and opioid on UDS  Psych for cessation management  Pt requesting STD testing but denies symptoms or high risk sexual activity.

## 2024-02-20 NOTE — HPI
Patient is a 33 year old male with polysubstance use disorder (active heroine use), anxiety and depression who presented to the ED with depression.  Hospital medicine consulted for medical management.

## 2024-02-20 NOTE — PLAN OF CARE
Behavioral Health Unit  Psychosocial History and Assessment  Progress Note      Patient Name: Ismael Gordon Jr. YOB: 1990 SW: [facility] Date: 2/20/2024    Chief Complaint: depression, anxiety and substance abuse     Consent:     Did the patient consent for an interview with the ? No    Did the patient consent for the  to contact family/friend/caregiver?   No    Did the patient give consent for the  to inform family/friend/caregiver of his/her whereabouts or to discuss discharge planning? No    Source of Information: Chart review    Is information obtained from interviews considered reliable?   yes    Reason for Admission:     Active Hospital Problems    Diagnosis  POA    Polysubstance use disorder [F19.90]  Yes      Resolved Hospital Problems   No resolved problems to display.       History of Present Illness - (Patient Perception):   Per chart review pt states he is here to detox off of heroin. States he wishes to get on the vivitrol shot, either here or once he leaves here at his follow-up appointment. Pt reports he last used heroin this morning. States he was using meth and benzos as supplementary drugs when he would be out of heroin. Pt does not want inpatient rehab, just to get on vivitrol shot.     History of Present Illness - (Perception of Others):    Per Dr. French Orgeron:  2/20/2024 2:02 PM   Ismael Gordon JrArti   1990   [id number]                                          DATE OF ADMISSION: 2/19/2024 12:57 PM     SITE: [facility]     CURRENT LEGAL STATUS: PEC and/or CEC        HISTORY    CHIEF COMPLAINT   Ismael Gordon Jr. is a 33 y.o. male with a past psychiatric history of Substance Abuse and bipolar  currently admitted to the inpatient unit with the following chief complaint:  depression, anxiety and substance abuse    HPI   The patient was seen and examined. The chart was reviewed.     The patient presented to the  "ER on 2/19/2024 .     The patient was medically cleared and admitted to the U.     Per ED MD:     Pt arrives to the ed with c/o "I'm coming off heroin I did this morning and I want to detox from it. Yall always help me when I need to detox." Denies any withdrawal symptoms. Last use of heroin was this morning. Denies si/hi and hallucinations.      History of Present Illness  Ismael Gordon Jr. is a 33 y.o. male that presents with anxiety & depression  Patient was an active heroin abuser, last use was this morning on ER interview  Patient has been to the psychiatric unit several times with depression & anxiety  Patient is also asking for evaluation for drug addiction treatment this morning  Patient denies any suicidal ideation but clearly has chronic drug addiction HX  Additionally, the patient has an unmedicated bipolar patient, not psychotic now     Per RN:  Pt calm and cooperative during admission.  States he is here to detox off of heroin.  States he wishes to get on the vivitrol shot, either here or once he leaves here at his follow-up appointment.  Pt reports he last used heroin this morning.  States he was using meth and benzos as supplementary drugs when he would be out of heroin.  Pt does not want inpatient rehab, just to get on vivitrol shot.  Pt also request STD bloodwork while he is here.  Pt denies any S/I or H/I at this time.  Mood is good.  Pt states he feels good, doesn't expect to start having withdrawal symptoms until probably tomorrow.        Pt agitated saying he needs to get out of here. Ran in the nurses station behind a med student, trying to get out pulling on doors. Security called and pt ran back to his room. Talked with pt, he said he was sorry he just felt like he needed to leave. He is detoxing. Pt given IM zyprexa at 1045, pt tolerated well. Pt met with , will start benzo taper. Pt restless, will give new orders when available. Pt repeatedly apologized for his behavior.       " "  Psychiatric interview:  Patient states he has been using methamphetamine and heroin. He uses IV. He states he typically uses meth once a week and heroin daily. He has been doing about 1 gram of heroin per day. He states he has been suffering from addiction for "years." He states his addiction makes him depressed and reports depression has been ongoing and worsening over the last year. Last use of heroin was yesterday. Now currently exhibiting and endorsing w/d symptoms.        Symptoms of Depression: diminished mood - Yes, loss of interest/anhedonia - Yes;  recurrent - Yes, >14 days - Yes, diminished energy - Yes, change in sleep - No, change in appetite - Yes, diminished concentration or cognition or indecisiveness - Yes, PMA/R -  Yes, excessive guilt or hopelessness or worthlessness - Yes, suicidal ideations - No     Changes in Sleep: trouble with initiation- No, maintenance, - No early morning awakening with inability to return to sleep - No, hypersomnolence - No     Suicidal- active/passive ideations - No, organized plans, future intentions - No     Homicidal ideations: active/passive ideations - No, organized plans, future intentions - No     Symptoms of psychosis: hallucinations - No, delusions - No, disorganized speech - No, disorganized behavior or abnormal motor behavior - No, or negative symptoms (diminshed emotional expression, avolition, anhedonia, alogia, asociality) - No, active phase symptoms >1 month - No, continuous signs of illness > 6 months - No, since onset of illness decreased level of functioning present - No     Symptoms of sonia or hypomania: elevated, expansive, or irritable mood with increased energy or activity - No; > 4 days - No,  >7 days - No; with inflated self-esteem or grandiosity - No, decreased need for sleep - No, increased rate of speech - No, FOI or racing thoughts - No, distractibility - No, increased goal directed activity or PMA - No, risky/disinhibited behavior - No   " "  Symptoms of KAILYN: excessive anxiety/worry/fear, more days than not, about numerous issues - No, ongoing for >6 months - No, difficult to control - No, with restlessness - No, fatigue - No, poor concentration - No, irritability - No, muscle tension - No, sleep disturbance - No; causes functionally impairing distress - No     Symptoms of Panic Disorder: recurrent panic attacks (palpitations/heart racing, sweating, shakiness, dyspnea, choking, chest pain/discomfort, Gi symptoms, dizzy/lightheadedness, hot/col flashes, paresthesias, derealization, fear of losing control or fear of dying or fear of "going crazy") - No, precipitated - No, un-precipitated - No, source of worry and/or behavioral changes secondary for 1 month or longer- No, agoraphobia - No     Symptoms of PTSD: h/o trauma exposure - No; re-experiencing/intrusive symptoms - No, avoidant behavior - No, 2 or more negative alterations in cognition or mood - No, 2 or more hyperarousal symptoms - No; with dissociative symptoms - No, ongoing for 1 or more  months - No     Symptoms of OCD: obsessions (recurrent thoughts/urges/images; intrusive and/or unwanted; uses other thoughts/actions to suppress) - No; compulsions (repetitive behaviors used to lower distress/anxiety/obsessions) - No, time-consuming (over 1 hour per day) or cause significant distress/impairment - - No     Symptoms of Anorexia: restriction of caloric intake leading to significantly low body weight - No, intense fear of gaining weight or persistent behavior that interferes with weight gain even thought at a significantly low weight - No, disturbance in the way in which one's body weight or shape is experienced, undue influence of body weight or shape on self evaluation, or persistent lack of recognition of the seriousness of the current low body weight - No     Symptoms of Bulimia: recurrent episodes of binge eating (definitely larger amount  than what others would eat and lack of a sense of " "control over eating during episode) - No, recurrent inappropriate compensatory behaviors in order to prevent weight gain (fasting, medications, exercise, vomiting) - No, binges and compensatory behaviors both occur on average at least once a week for 3 months - No, self evaluations is unduly influenced by body shape/weight- - No     Symptoms of Binge eating: recurrent episodes of binge eating (definitely larger amount than what others would eat and lack of a sense of control over eating during episode) - No, 3 or more of following (eating much more rapidly, eating until uncomfortably full, large amounts when not hungry, eating alone because of embarrassed by how much,  feeling disgusted with oneself, depressed or very guilty afterward) - No, distress regarding binges - No, binges occur on average at least once a week for 3 months - No        PAST PSYCHIATRIC HISTORY  Previous Psychiatric Hospitalizations: Yes  Previous SI/HI: Yes,  Previous Suicide Attempts: No,   Previous Medication Trials: Yes,  Psychiatric Care (current & past): Yes,  History of Psychotherapy: Yes,  History of Violence: No,  History of sexual/physical abuse: No,     PAST MEDICAL & SURGICAL HISTORY        Past Medical History:   Diagnosis Date    Bipolar 1 disorder      Depression      Hepatitis C      History of psychiatric hospitalization       "It has all been to get to previous 28 day programs."    Hx of psychiatric care      Psychiatric exam requested by authority      Psychiatric problem      Suicide attempt 01/16/2017     'I did an overdose on heroin so you could say passive suicide in my opinion."    Therapy              Past Surgical History:   Procedure Laterality Date    CHOLECYSTECTOMY                Home Meds:           Prior to Admission medications    Medication Sig Start Date End Date Taking? Authorizing Provider   hydrOXYzine pamoate (VISTARIL) 50 MG Cap Take 1 capsule (50 mg total) by mouth every evening. 8/27/21     Kelly, " Manolo HENDERSON MD   mirtazapine (REMERON) 15 MG tablet Take 1 tablet (15 mg total) by mouth every evening. 5/3/22 5/3/23   Anshul Abraham MD   nicotine (NICODERM CQ) 14 mg/24 hr Place 1 patch onto the skin once daily. 5/3/22     Anshul Abraham MD            Scheduled Meds:    folic acid  1 mg Oral Daily    multivitamin  1 tablet Oral Daily    nicotine  1 patch Transdermal Daily    thiamine  100 mg Oral Daily      PRN Meds: acetaminophen, aluminum-magnesium hydroxide-simethicone, hydrOXYzine HCL, ibuprofen, loperamide, melatonin, OLANZapine **AND** OLANZapine, ondansetron   Psychotherapeutics (From admission, onward)        Start     Stop Route Frequency Ordered     02/19/24 1305   OLANZapine tablet 10 mg  (Olanzapine PRN (</= 64 yo))        See Hyperspace for full Linked Orders Report.    -- Oral Every 8 hours PRN 02/19/24 1305     02/19/24 1305   OLANZapine injection 10 mg  (Olanzapine PRN (</= 64 yo))        See Hyperspace for full Linked Orders Report.    -- IM Every 8 hours PRN 02/19/24 1305                ALLERGIES   Review of patient's allergies indicates:  No Known Allergies     NEUROLOGIC HISTORY  Seizures: No  Head trauma: No     SOCIAL HISTORY:  Developmental/Childhood:Achieved all developmental milestones timely  Education: some college  Employment Status/Finances:Employed as    Relationship Status/Sexual Orientation: single  Children: 0  Housing Status: Home    history:  NO   Access to Firearms: NO ;  Locked up? n/a  Evangelical:Non-practicing  Recreational activities: none     SUBSTANCE ABUSE HISTORY   Recreational Drugs: methamphetamines and narcotics   Use of Alcohol: occasional, social use  Rehab History:yes   Tobacco Use:yes     LEGAL HISTORY:   Past charges/incarcerations: YES:     Pending charges:NO     FAMILY PSYCHIATRIC HISTORY         Family History   Problem Relation Age of Onset    Alcohol abuse Mother      No Known Problems Father      No Known Problems Sister       Drug abuse Brother      Anxiety disorder Maternal Aunt      Drug abuse Maternal Aunt      Drug abuse Paternal Aunt      Drug abuse Maternal Uncle      Anxiety disorder Maternal Uncle      Drug abuse Paternal Uncle      No Known Problems Maternal Grandfather      No Known Problems Maternal Grandmother      No Known Problems Paternal Grandfather      No Known Problems Paternal Grandmother      Drug abuse Cousin      Anxiety disorder Cousin      Alcohol abuse Cousin      No Known Problems Maternal Aunt      Drug abuse Maternal Aunt      No Known Problems Paternal Cousin                 ROS  Review of Systems   Constitutional:  Negative for chills and fever.   HENT:  Negative for hearing loss.    Eyes:  Negative for blurred vision and double vision.   Respiratory:  Negative for shortness of breath.    Cardiovascular:  Negative for chest pain and palpitations.   Gastrointestinal:  Positive for nausea. Negative for constipation, diarrhea and vomiting.   Genitourinary:  Negative for dysuria.   Musculoskeletal:  Positive for myalgias. Negative for back pain and neck pain.   Skin:  Negative for rash.   Neurological:  Negative for dizziness and headaches.   Endo/Heme/Allergies:  Negative for environmental allergies.            EXAMINATION     PHYSICAL EXAM  Reviewed note/exam by Darin Pacheco MD from 02/19/24 0819     VITALS       Vitals:     02/20/24 0738   BP: (!) 114/58   Pulse: 71   Resp: 18   Temp: 97.9 °F (36.6 °C)         Body mass index is 23.79 kg/m².           PAIN  0/10  Subjective report of pain matches objective signs and symptoms: Yes     LABORATORY DATA   Recent Results         Recent Results (from the past 72 hour(s))   CBC Auto Differential     Collection Time: 02/19/24  8:37 AM   Result Value Ref Range     WBC 7.56 3.90 - 12.70 K/uL     RBC 5.13 4.60 - 6.20 M/uL     Hemoglobin 14.2 14.0 - 18.0 g/dL     Hematocrit 43.8 40.0 - 54.0 %     MCV 85 82 - 98 fL     MCH 27.7 27.0 - 31.0 pg     MCHC 32.4 32.0  - 36.0 g/dL     RDW 13.2 11.5 - 14.5 %     Platelets 279 150 - 450 K/uL     MPV 9.3 9.2 - 12.9 fL     Immature Granulocytes 0.5 0.0 - 0.5 %     Gran # (ANC) 5.5 1.8 - 7.7 K/uL     Immature Grans (Abs) 0.04 0.00 - 0.04 K/uL     Lymph # 1.3 1.0 - 4.8 K/uL     Mono # 0.5 0.3 - 1.0 K/uL     Eos # 0.2 0.0 - 0.5 K/uL     Baso # 0.05 0.00 - 0.20 K/uL     nRBC 0 0 /100 WBC     Gran % 72.9 38.0 - 73.0 %     Lymph % 16.8 (L) 18.0 - 48.0 %     Mono % 7.1 4.0 - 15.0 %     Eosinophil % 2.0 0.0 - 8.0 %     Basophil % 0.7 0.0 - 1.9 %     Differential Method Automated     TSH     Collection Time: 02/19/24  8:37 AM   Result Value Ref Range     TSH 2.738 0.400 - 4.000 uIU/mL   Salicylate Level     Collection Time: 02/19/24  8:37 AM   Result Value Ref Range     Salicylate Lvl <5.0 (L) 15.0 - 30.0 mg/dL   Acetaminophen Level     Collection Time: 02/19/24  8:37 AM   Result Value Ref Range     Acetaminophen (Tylenol), Serum <3.0 (L) 10.0 - 20.0 ug/mL   Ethanol     Collection Time: 02/19/24  8:37 AM   Result Value Ref Range     Alcohol, Serum <10 <10 mg/dL   Comprehensive Metabolic Panel     Collection Time: 02/19/24  8:37 AM   Result Value Ref Range     Sodium 140 136 - 145 mmol/L     Potassium 3.6 3.5 - 5.1 mmol/L     Chloride 103 95 - 110 mmol/L     CO2 28 23 - 29 mmol/L     Glucose 126 (H) 70 - 110 mg/dL     BUN 24 (H) 6 - 20 mg/dL     Creatinine 0.9 0.5 - 1.4 mg/dL     Calcium 9.7 8.7 - 10.5 mg/dL     Total Protein 7.7 6.0 - 8.4 g/dL     Albumin 4.1 3.5 - 5.2 g/dL     Total Bilirubin 0.5 0.1 - 1.0 mg/dL     Alkaline Phosphatase 68 55 - 135 U/L     AST 17 10 - 40 U/L     ALT 22 10 - 44 U/L     eGFR >60 >60 mL/min/1.73 m^2     Anion Gap 9 8 - 16 mmol/L   Drug screen panel, in-house     Collection Time: 02/19/24  8:38 AM   Result Value Ref Range     Benzodiazepines Negative Negative     Methadone metabolites Negative Negative     Cocaine (Metab.) Negative Negative     Opiate Scrn, Ur Presumptive Positive (A) Negative     Barbiturate  "Screen, Ur Negative Negative     Amphetamine Screen, Ur Presumptive Positive (A) Negative     THC Presumptive Positive (A) Negative     Phencyclidine Negative Negative     Creatinine, Urine 354.8 23.0 - 375.0 mg/dL     Toxicology Information SEE COMMENT     Urinalysis, Reflex to Urine Culture Urine, Clean Catch     Collection Time: 02/19/24  8:38 AM     Specimen: Urine   Result Value Ref Range     Specimen UA Urine, Clean Catch       Color, UA Yellow Yellow, Straw, Kelly     Appearance, UA Clear Clear     pH, UA 6.0 5.0 - 8.0     Specific Gravity, UA >=1.030 (A) 1.005 - 1.030     Protein, UA Negative Negative     Glucose, UA Trace (A) Negative     Ketones, UA Trace (A) Negative     Bilirubin (UA) 1+ (A) Negative     Occult Blood UA Negative Negative     Nitrite, UA Negative Negative     Urobilinogen, UA Negative <2.0 EU/dL     Leukocytes, UA Negative Negative         No results found for: "PHENYTOIN", "PHENOBARB", "VALPROATE", "CBMZ"           CONSTITUTIONAL  General Appearance: unremarkable, age appropriate     MUSCULOSKELETAL  Muscle Strength and Tone:no tremor, no tic  Abnormal Involuntary Movements: No  Gait and Station: non-ataxic     PSYCHIATRIC   Level of Consciousness: awake and alert   Orientation: person, place, and situation  Grooming: Disheveled and Hospital garb  Psychomotor Behavior: reluctant to participate, restless and fidgety   Speech: increased latency of response, non-spontaneous, monotone, rapid  Language: grossly intact  Mood: depressed  Affect: Consistent with mood  Thought Process: linear  Associations: intact   Thought Content: denies SI, denies HI, and no delusions  Perceptions: denies AH and denies  VH  Memory: Able to recall past events, Remote intact, and Recent intact  Attention:Attends to interview without distraction  Fund of Knowledge: Aware of current events and Vocabulary appropriate   Estimate if Intelligence:  Average based on work/education history, vocabulary and mental status " exam  Insight: has awareness of illness  Judgment: limited        PSYCHOSOCIAL     PSYCHOSOCIAL STRESSORS   drug abuse  Housing (has to live with his mother)        FUNCTIONING RELATIONSHIPS   good support system     STRENGTHS AND LIABILITIES   Strength: Patient accepts guidance/feedback, Strength: Patient is expressive/articulate., Liability: Patient is impulsive., Liability: Patient lacks coping skills.     Is the patient aware of the biomedical complications associated with substance abuse and mental illness? yes     Does the patient have an Advance Directive for Mental Health treatment? no  (If yes, inform patient to bring copy.)           MEDICAL DECISION MAKING          ASSESSMENT         MDD, recurrent, severe  Unspecified anxiety disorder  Amphetamine abuse  Opiate abuse  Nicotine dependence           PROBLEM LIST AND MANAGEMENT PLANS     MDD, recurrent, severe  - start remeron 7.5 mg PO qhs  - pt counseled  - follow up with outpatient mental health providers after discharge for medication management and psychotherapy     Unspecified anxiety disorder  - start hydroxyzine 50 mg PO q 6 hours PRN  - pt counseled  - follow up with outpatient mental health providers after discharge for medication management and psychotherapy     Amphetamine abuse  - start valium 10 mg PO TID for detox to assist with agitation  - pt counseled  - SW consulted for assistance with additional resources   - consider rehab     Opiate abuse  - pt requesting vivitrol, will plan to administer prior to discharge once detox controlled if PO challenge successful  - prns for sx w/d  - pt counseled  - SW consulted for assistance with additional resources   - consider rehab     Nicotine dependence  - start nicotine patch 14 mg PO qd PRN         PRESCRIPTION DRUG MANAGEMENT  Compliance: unknown  Side Effects: unknown  Regimen Adjustments: see above     Discussed diagnosis, risks and benefits of proposed treatment vs alternative treatments vs no  "treatment, potential side effects of these treatments and the inherent unpredictability of treatment. The patient expresses understanding of the above and displays the capacity to agree with this treatment given said understanding. Patient also agrees that, currently, the benefits outweigh the risks and would like to pursue/continue treatment at this time.     Any medications being used off-label were discussed with the patient inclusive of the evidence base for the use of the medications and consent was obtained for the off-label use of the medication.            DIAGNOSTIC TESTING  Labs reviewed with patient; follow up pending labs     Disposition:  -Will attempt to obtain outside psychiatric records if available  -SW to assist with aftercare planning and collateral  -Once stable discharge home with outpatient follow up care and/or rehab  -Continue inpatient treatment under a PEC and/or CEC for danger to self/ danger to others/grave disability as evident by gravely disabled    Present biopsychosocial functioning: Pt is a 33 year old male with chief complaints of depression, anxiety and substance abuse. Patient states his admit is due to "detox." Patient admits to negative leisure lifestyle of cigarettes,heroin, and methamphetamines. Patient reports he is single, no children,has GED, and is employed as a .    Past biopsychosocial functioning:   Pt has a past psychiatric history of Substance Abuse and bipolar. Pt was last seen at [facility] on 04/20/2022, 08/23/2021, and at [facility] 05/13/2017 for substance abuse, depression, anxiety. Pt misused medications such as Klonopin or Valium in the pst. Per chart review pt has been to 7 rehabs and last one ws in 2017. Pt also participated in the 12 step meetings and has stayed sober for 4 months in the past. Pt has had a history of withdrawals from Opiates and meth in the past.     Family and Marital/Relationship History:     Significant Other/Partner Relationships:  Single:  " "    Family Relationships: Strained      Childhood History:     Where was patient raised[place]     Who raised the patient?  Biological Parents      How does patient describe their childhood?  Good      Who is patient's primary support person?   Ismael Gordon/father      Culture and Muslim:     Muslim: Bahai    How strong of a role does Christianity and spirituality play in patient's life?   Non-practicing     Temple or spiritual concerns regarding treatment: not applicable     History of Abuse:   History of Abuse: Denies      Outcome: n/a    Psychiatric and Medical History:     History of psychiatric illness or treatment: prior inpatient treatment and has participated in counseling/psychotherapy on an outpatient basis in the past    Medical history:   Past Medical History:   Diagnosis Date    Bipolar 1 disorder     Depression     Hepatitis C     History of psychiatric hospitalization     "It has all been to get to previous 28 day programs."    Hx of psychiatric care     Psychiatric exam requested by authority     Psychiatric problem     Suicide attempt 01/16/2017    'I did an overdose on heroin so you could say passive suicide in my opinion."    Therapy        Substance Abuse History:     Alcohol - (Patient Perspective):   Social History     Substance and Sexual Activity   Alcohol Use No       Alcohol - (Collateral Perspective):    Per chart review pt does not endorse in alcohol.    Drugs - (Patient Perspective):   Social History     Substance and Sexual Activity   Drug Use Yes    Types: Methamphetamines, Marijuana    Comment: Feb 2015 heroin "but very rarely but when I do I blow out."       Drugs - (Collateral Perspective):    Per chart review pt does endorse in methamphetamine and heroin. He uses IV. He states he typically uses meth once a week and heroin daily. He has been doing about 1 gram of heroin per day.     Additional Comments:   Pt UDS was positive upon admit.    Education:     Currently " Enrolled? No  Attended College/Technical School    Special Education? No    Interested in Completing Education/GED: No    Employment and Financial:     Currently employed? Employed: Current Occupation:     Source of Income: salary    Able to afford basic needs (food, shelter, utilities)? Yes    Who manages finances/personal affairs?  self      Service:     ? no    Combat Service? No     Community Resources:     Describe present use of community resources: [facility]     Identify previously used community resources   (Include previous mental health treatment - outpatient and inpatient):    [facility]    Environmental:     Current living situation:Lives with friend    Social Evaluation:     Patient Assets: General fund of knowledge and Communicable skills    Patient Limitations: none    High risk psychosocial issues that may impact discharge planning:   depression, anxiety and substance abuse        Recommendations:     Anticipated discharge plan:   outpatient follow up: [facility]    High risk issues requiring early treatment planning and immediate intervention:   depression, anxiety and substance abuse        Community resources needed for discharge planning:  aftercare treatment sources    Anticipated social work role(s) in treatment and discharge planning:   PLPC will encourage pt to participate in treatment including daily groups. Pt will be encouraged to seek substance abuse rehab or out patient treatment if pt desires. PLPC will assist in follow up care planning.

## 2024-02-20 NOTE — PLAN OF CARE
Pt is sleeping at this time and has slept 9.5 hours with 1 awakening.  NAD.  Resp even & unlabored.  Pathways clear.  Q 15 minute safety checks ongoing.  All precautions maintained

## 2024-02-21 LAB
C TRACH DNA SPEC QL NAA+PROBE: NOT DETECTED
HIV1+2 IGG SERPL QL IA.RAPID: NORMAL
N GONORRHOEA DNA SPEC QL NAA+PROBE: NOT DETECTED

## 2024-02-21 PROCEDURE — 11400000 HC PSYCH PRIVATE ROOM

## 2024-02-21 PROCEDURE — 36415 COLL VENOUS BLD VENIPUNCTURE: CPT | Performed by: PHYSICIAN ASSISTANT

## 2024-02-21 PROCEDURE — S4991 NICOTINE PATCH NONLEGEND: HCPCS | Performed by: STUDENT IN AN ORGANIZED HEALTH CARE EDUCATION/TRAINING PROGRAM

## 2024-02-21 PROCEDURE — 25000003 PHARM REV CODE 250: Performed by: STUDENT IN AN ORGANIZED HEALTH CARE EDUCATION/TRAINING PROGRAM

## 2024-02-21 PROCEDURE — 87491 CHLMYD TRACH DNA AMP PROBE: CPT | Performed by: STUDENT IN AN ORGANIZED HEALTH CARE EDUCATION/TRAINING PROGRAM

## 2024-02-21 PROCEDURE — 99233 SBSQ HOSP IP/OBS HIGH 50: CPT | Mod: ,,, | Performed by: STUDENT IN AN ORGANIZED HEALTH CARE EDUCATION/TRAINING PROGRAM

## 2024-02-21 RX ORDER — MIRTAZAPINE 15 MG/1
15 TABLET, FILM COATED ORAL NIGHTLY
Status: DISCONTINUED | OUTPATIENT
Start: 2024-02-21 | End: 2024-02-26 | Stop reason: HOSPADM

## 2024-02-21 RX ADMIN — Medication 6 MG: at 08:02

## 2024-02-21 RX ADMIN — ONDANSETRON 4 MG: 4 TABLET, ORALLY DISINTEGRATING ORAL at 03:02

## 2024-02-21 RX ADMIN — IBUPROFEN 800 MG: 800 TABLET, FILM COATED ORAL at 07:02

## 2024-02-21 RX ADMIN — DIAZEPAM 10 MG: 10 TABLET ORAL at 08:02

## 2024-02-21 RX ADMIN — MIRTAZAPINE 15 MG: 15 TABLET, FILM COATED ORAL at 08:02

## 2024-02-21 RX ADMIN — HYDROXYZINE HYDROCHLORIDE 50 MG: 25 TABLET, FILM COATED ORAL at 10:02

## 2024-02-21 RX ADMIN — METHOCARBAMOL TABLETS 500 MG: 500 TABLET, COATED ORAL at 07:02

## 2024-02-21 RX ADMIN — THERA TABS 1 TABLET: TAB at 08:02

## 2024-02-21 RX ADMIN — METHOCARBAMOL TABLETS 500 MG: 500 TABLET, COATED ORAL at 08:02

## 2024-02-21 RX ADMIN — DIAZEPAM 10 MG: 10 TABLET ORAL at 02:02

## 2024-02-21 RX ADMIN — Medication 100 MG: at 08:02

## 2024-02-21 RX ADMIN — NICOTINE 1 PATCH: 21 PATCH, EXTENDED RELEASE TRANSDERMAL at 08:02

## 2024-02-21 RX ADMIN — FOLIC ACID 1 MG: 1 TABLET ORAL at 08:02

## 2024-02-21 NOTE — PROGRESS NOTES
02/21/24 1158   Assessment   Patient's Identification of the Problem Patient presents disheveled, restless, irritable, depressed, isolating, withdrawn. Patient refused to participate in the assessment at this time, chart reviewed to initiate an assessment. Per H&P chief compliant: depression, anxiety and substance abuse. Patient substance abuse history include methamphetamines, narcotics, alcohol and tabacco. Patient is single, no child, has some college, is employed as a . Per chart patient is interested in attending outpatient treatmant.   Leisure Interest Other (See Comments)  (MACIE)   Leisure Barriers Drug Use;ETOH Use   Treatment Focus To Improve Mood;To Improve Leisure Awareness/Lifestyle/Interest;To Promote Successful and Safe Self Expression;To Improve Coping Skills;To Educate and Increase Awareness of Sober Free Activities       Treatment Recommendation:   1:1 Intervention (as needed)    Cognitive Stimulation Skilled Activity  Mild Exercises Skilled Activity  Coping Skilled Activity  Leisure Education and Awareness Skilled Activity    Treatment Goal(s):  Long Term Goals Refer To Master Treatment Plan    Short Term Treatment Goal(s)  Patient Will:  Comply with Treatment  Exhibit Improvement in Mood  Demonstrate Constructive Expression of Feelings and Behavior  Verbalize Improvement in Mood  Identify at Least 2 Coping Skills or Leisure Skills to Reduce Depression and Hopelessness Upon Request from Therapist  Identify (+) Leisure / Recreation Activities that Promote Wellness and Promote a Sober Lifestyle    Discharge Recommendations:  Encourage Patient to Actively Utilize Available Community Resources to Increase Leisure Involvement to Decrease Signs and Symptoms of Illness  Encourage Patient to Utilize Coping Skills on a Regular Basis to Reduce the Risk of Decompensating and Re-Hospitalizations  AA/NA Meetings  Support Group  Follow Up with After Care Appointments

## 2024-02-21 NOTE — PLAN OF CARE
Problem: Adult Behavioral Health Plan of Care  Goal: Optimized Coping Skills in Response to Life Stressors  Outcome: Ongoing, Progressing    Process Group:  Pt did not attend group today. PLPC met with pt individually.  PLPC attempted to  discuss with pt on group discussion. Pt was  walking in hallway. Pt states that he was very tired and needed to est. Pt was very agitated, withdrawn, and focused on discharge.  PLPC discussed with pt PLPC will come back at a later time and date to discuss group.

## 2024-02-21 NOTE — PLAN OF CARE
Pt anxious and agitated at times, restless, VS WNL, denies SI at this time, pt moaning, pt drinking poweraide and had snack, safety precautions maintained, will continue to monitor

## 2024-02-21 NOTE — PLAN OF CARE
Pt calm and cooperative at this time. Pt apologetic for yesterdays behavior.  Pt states if he sees the opportunity he will take it again.   Pt accepts meals and meds without difficulty.  Pt impulsive, elopement risk.   Drug seeking behavior.  Isolates in room. No interaction with peers.  Rates depression 5/10. On scale 1-10 with 10 worse level of depression.   Rates anxiety 5/10 on same scale.   Denies SI and HI.  Denies hallucinations.  Will monitor for needs and safety.

## 2024-02-21 NOTE — PROGRESS NOTES
PSYCHIATRY DAILY INPATIENT PROGRESS NOTE  SUBSEQUENT HOSPITAL VISIT    ENCOUNTER DATE: 2/21/2024  SITE: [facility]    DATE OF ADMISSION: 2/19/2024 12:57 PM  LENGTH OF STAY: 2 days      CHIEF COMPLAINT   Ismael Gordon Jr. is a 33 y.o. male, seen during daily white rounds on the inpatient unit.  Ismael Gordon Jr. presented with the chief complaint of depression, anxiety, and substance problems      The patient was seen and examined. The chart was reviewed.     Reviewed notes from Rns, PA, and CTRS and labs from the last 24 hours.    The patient's case was discussed with the treatment team/care providers today including Rns and LPC    Staff reports no behavioral or management issues.     The patient has been compliant with treatment.      Subjective 02/21/2024       Today the patient reports continued withdrawal. He states he is willing to do outpatient treatment and receive GUAMAN vivitrol prior to discharge. He states that he feels restless and irritable currently. Explored motivation for sobriety which included finances and his family.    The patient denies any side effects to medications.      Interim/overnight events per report/notes:    Per RN:       Pt anxious and agitated at times, restless, VS WNL, denies SI at this time, pt moaning, pt drinking poweraide and had snack, safety precautions maintained, will continue to monitor             Psychiatric ROS (observed, reported, or endorsed/denied):  Depressed mood - less  Interest/pleasure/anhedonia: less  Guilt/hopelessness/worthlessness - less  Changes in Sleep - fluctuating  Changes in Appetite - fluctuating  Changes in Concentration - fluctuating  Changes in Energy - fluctuating  PMA/R- less  Suicidal- active/passive ideations - No  Homicidal ideations: active/passive ideations - No    Hallucinations - No  Delusions - No  Disorganized behavior - No  Disorganized speech - No  Negative symptoms - No    Elevated mood - No  Decreased need for sleep -  "No  Grandiosity - No  Racing thoughts - No  Impulsivity - No  Irritability- No  Increased energy - No  Distractibility - No  Increase in goal-directed activity or PMA- No    Symptoms of KAILYN - fluctuating  Symptoms of Panic Disorder- No  Symptoms of PTSD - No        Overall progress: Patient is showing mild improvement         Medical ROS  Review of Systems   Constitutional:  Negative for chills and fever.   HENT:  Negative for hearing loss.    Eyes:  Negative for blurred vision and double vision.   Respiratory:  Negative for shortness of breath.    Cardiovascular:  Negative for chest pain and palpitations.   Gastrointestinal:  Negative for constipation, diarrhea, nausea and vomiting.   Genitourinary:  Negative for dysuria.   Musculoskeletal:  Negative for back pain and neck pain.   Skin:  Negative for rash.   Neurological:  Negative for dizziness and headaches.   Endo/Heme/Allergies:  Negative for environmental allergies.         PAST MEDICAL HISTORY   Past Medical History:   Diagnosis Date    Bipolar 1 disorder     Depression     Hepatitis C     History of psychiatric hospitalization     "It has all been to get to previous 28 day programs."    Hx of psychiatric care     Psychiatric exam requested by authority     Psychiatric problem     Suicide attempt 01/16/2017    'I did an overdose on heroin so you could say passive suicide in my opinion."    Therapy            PSYCHOTROPIC MEDICATIONS   Scheduled Meds:   diazePAM  10 mg Oral TID    folic acid  1 mg Oral Daily    mirtazapine  7.5 mg Oral QHS    multivitamin  1 tablet Oral Daily    nicotine  1 patch Transdermal Daily    thiamine  100 mg Oral Daily     Continuous Infusions:  PRN Meds:.acetaminophen, aluminum-magnesium hydroxide-simethicone, dicyclomine, hydrOXYzine HCL, ibuprofen, loperamide, melatonin, methocarbamoL, OLANZapine **AND** OLANZapine, ondansetron        EXAMINATION    VITALS   Vitals:    02/20/24 0738 02/20/24 1953 02/21/24 0754 02/21/24 0800   BP: " (!) 114/58 124/80 128/79    BP Location: Right arm  Right arm    Patient Position: Lying  Sitting    Pulse: 71 83 66    Resp: 18 18 16    Temp: 97.9 °F (36.6 °C) 98.5 °F (36.9 °C) 97.7 °F (36.5 °C)    TempSrc: Temporal  Temporal    Weight:    75.2 kg (165 lb 12.6 oz)   Height:           Body mass index is 23.79 kg/m².        CONSTITUTIONAL  General Appearance: unremarkable, age appropriate    MUSCULOSKELETAL  Muscle Strength and Tone:no tremor, no tic  Abnormal Involuntary Movements: No  Gait and Station: non-ataxic    PSYCHIATRIC   Level of Consciousness: awake and alert   Orientation: person, place, and situation  Grooming: Casually dressed and Well groomed  Psychomotor Behavior: normal, cooperative  Speech: normal tone, normal rate, normal pitch, normal volume  Language: grossly intact  Mood: irritable  Affect: Consistent with mood  Thought Process: linear, logical  Associations: intact   Thought Content: denies SI, denies HI, and no delusions  Perceptions: denies AH and denies  VH  Memory: Able to recall past events, Remote intact, and Recent intact  Attention:Attends to interview without distraction  Fund of Knowledge: Aware of current events and Vocabulary appropriate   Estimate if Intelligence:  Average based on work/education history, vocabulary and mental status exam  Insight: has awareness of illness  Judgment: behavior is adequate to circumstances        DIAGNOSTIC TESTING   Laboratory Results  No results found for this or any previous visit (from the past 24 hour(s)).        MEDICAL DECISION MAKING          ASSESSMENT         MDD, recurrent, severe  Unspecified anxiety disorder  Amphetamine abuse  Opiate abuse  Nicotine dependence           PROBLEM LIST AND MANAGEMENT PLANS         MDD, recurrent, severe  - increase remeron 7.5 mg PO qhs to 15 mg PO qhs  - pt counseled  - follow up with outpatient mental health providers after discharge for medication management and psychotherapy     Unspecified anxiety  disorder  - continue hydroxyzine 50 mg PO q 6 hours PRN  - pt counseled  - follow up with outpatient mental health providers after discharge for medication management and psychotherapy     Amphetamine abuse  - continue valium 10 mg PO TID for detox to assist with agitation  - pt counseled  - LEE consulted for assistance with additional resources   - consider rehab       Opiate abuse  - pt requesting vivitrol, will plan to administer prior to discharge once detox controlled if PO challenge successful  - prns for sx w/d  - pt counseled  - LEE consulted for assistance with additional resources   - consider rehab       Nicotine dependence  - continue nicotine patch 14 mg PO qd PRN          Discussed diagnosis, risks and benefits of proposed treatment vs alternative treatments vs no treatment, potential side effects of these treatments and the inherent unpredictability of treatment. The patient expresses understanding of the above and displays the capacity to agree with this treatment given said understanding. Patient also agrees that, currently, the benefits outweigh the risks and would like to pursue/continue treatment at this time.    Any medications being used off-label were discussed with the patient inclusive of the evidence base for the use of the medications and consent was obtained for the off-label use of the medication.       DISCHARGE PLANNING  Expected Disposition Plan: Home or Self Care        NEED FOR CONTINUED HOSPITALIZATION  Psychiatric illness continues to pose a potential threat to life or bodily function, of self or others, thereby requiring the need for continued inpatient psychiatric hospitalization: Yes, due to: danger to self and gravely disabled, as evidenced by:  Ongoing concerns with grave disability with patient unable to perform basic feeding, hygiene and dressing activities without significant constant support.    Protective inpatient pyschiatric hospitalization required while a safe  disposition plan is enacted: Yes    Patient stabilized and ready for discharge from inpatient psychiatric unit: No        STAFF:   Manolo Mendoza III, MD  Psychiatry

## 2024-02-21 NOTE — PROGRESS NOTES
02/21/24 1237   [facility] Group Therapy   Group Name Other  (1:1 attempt)   Participation Level None   Participation Quality Withdrawn;Lack of Interest     CTRS was advised by the RN not to bother the patient. Patient presents disheveled, restless, depressed, withdrawn, irritable and is focused on discharge.

## 2024-02-21 NOTE — PLAN OF CARE
Lying quietly in bed, eyes closed, respirations even, unlabored. Apparently asleep. Slept 2.5 hours thus far with 5 interruptions. Safety precautions maintained. Rounds done every 15 minutes. Bed is fixed in low position and room is uncluttered and pathways are clear.

## 2024-02-22 PROCEDURE — 90833 PSYTX W PT W E/M 30 MIN: CPT | Mod: ,,, | Performed by: STUDENT IN AN ORGANIZED HEALTH CARE EDUCATION/TRAINING PROGRAM

## 2024-02-22 PROCEDURE — S4991 NICOTINE PATCH NONLEGEND: HCPCS | Performed by: STUDENT IN AN ORGANIZED HEALTH CARE EDUCATION/TRAINING PROGRAM

## 2024-02-22 PROCEDURE — 11400000 HC PSYCH PRIVATE ROOM

## 2024-02-22 PROCEDURE — 99233 SBSQ HOSP IP/OBS HIGH 50: CPT | Mod: ,,, | Performed by: STUDENT IN AN ORGANIZED HEALTH CARE EDUCATION/TRAINING PROGRAM

## 2024-02-22 PROCEDURE — 25000003 PHARM REV CODE 250: Performed by: STUDENT IN AN ORGANIZED HEALTH CARE EDUCATION/TRAINING PROGRAM

## 2024-02-22 RX ORDER — NALTREXONE HYDROCHLORIDE 50 MG/1
50 TABLET, FILM COATED ORAL
Status: DISCONTINUED | OUTPATIENT
Start: 2024-02-23 | End: 2024-02-24

## 2024-02-22 RX ORDER — DIAZEPAM 10 MG/1
10 TABLET ORAL
Status: DISCONTINUED | OUTPATIENT
Start: 2024-02-22 | End: 2024-02-23

## 2024-02-22 RX ORDER — OLANZAPINE 10 MG/1
10 TABLET ORAL NIGHTLY
Status: DISCONTINUED | OUTPATIENT
Start: 2024-02-22 | End: 2024-02-26 | Stop reason: HOSPADM

## 2024-02-22 RX ORDER — DIAZEPAM 10 MG/1
10 TABLET ORAL DAILY
Status: DISCONTINUED | OUTPATIENT
Start: 2024-02-23 | End: 2024-02-24

## 2024-02-22 RX ADMIN — Medication 6 MG: at 08:02

## 2024-02-22 RX ADMIN — NICOTINE 1 PATCH: 21 PATCH, EXTENDED RELEASE TRANSDERMAL at 08:02

## 2024-02-22 RX ADMIN — IBUPROFEN 800 MG: 800 TABLET, FILM COATED ORAL at 08:02

## 2024-02-22 RX ADMIN — Medication 100 MG: at 08:02

## 2024-02-22 RX ADMIN — DIAZEPAM 10 MG: 10 TABLET ORAL at 01:02

## 2024-02-22 RX ADMIN — HYDROXYZINE HYDROCHLORIDE 50 MG: 25 TABLET, FILM COATED ORAL at 08:02

## 2024-02-22 RX ADMIN — THERA TABS 1 TABLET: TAB at 08:02

## 2024-02-22 RX ADMIN — DIAZEPAM 10 MG: 10 TABLET ORAL at 08:02

## 2024-02-22 RX ADMIN — OLANZAPINE 10 MG: 10 TABLET, FILM COATED ORAL at 08:02

## 2024-02-22 RX ADMIN — MIRTAZAPINE 15 MG: 15 TABLET, FILM COATED ORAL at 08:02

## 2024-02-22 RX ADMIN — DICYCLOMINE HYDROCHLORIDE 10 MG: 10 CAPSULE ORAL at 04:02

## 2024-02-22 RX ADMIN — FOLIC ACID 1 MG: 1 TABLET ORAL at 08:02

## 2024-02-22 RX ADMIN — DICYCLOMINE HYDROCHLORIDE 10 MG: 10 CAPSULE ORAL at 08:02

## 2024-02-22 NOTE — PLAN OF CARE
Patient blunted affect and depressed mood. Patient irritable at times. No S/S of detoxPatient reports on a scale of 1-10 with ten being the highest level of depression. Patient rating depression today a 8/10. On the same scale patient rating anxiety a 9/10. Encouraged patient to attend groups and activities.  Encouraged patient to live a drug free life style.  Encouraged this patient to seek substance abuse rehab to gain insight into illness and to gain coping skills to help facilitate soberity. Poor insight into substance abuse. Patient refusing substance abuse rehab at this time. Patient denies SI/HI no self harming behavior displayed, no aggressive behavior displayed towards others. Patient contracted safety with staff and unit. Denies A/V hallucinations, no interacting with internal stimuli noted. Discussed healthy coping skills and techniques. Educated, reviewed  and discussed plan of care and medication regiment with this patient. Discussed and educated with this patient any concerns that needed to be addressed along with the importance of medication compliance  1:1 with this patient. Patient voices understanding of all teachings.

## 2024-02-22 NOTE — NURSING
Patient C/O body ache. Administered Ibuprofen 800 MG PO Patient tolerated well. Patient requesting Dicyclomine. Administered Dicyclomine 10 MG PO Patient tolerated well. Will continue to monitor.

## 2024-02-22 NOTE — PROGRESS NOTES
02/22/24 1234   [facility] Group Therapy   Group Name Other  (1:1 attempt)   Participation Level None   Participation Quality Sleeping     CTRS was advised by the RN to leave the patient alone at this time. Patient resting in bed, under the covers.

## 2024-02-22 NOTE — PLAN OF CARE
Problem: Adult Behavioral Health Plan of Care  Goal: Optimized Coping Skills in Response to Life Stressors  Outcome: Ongoing, Progressing     Pt did not attend group today. PLPC met with pt individually.  PLPC attempted to  discuss with pt on group discussion. Pt was resting in bed quietly. Pt  disheveled and irritated. PLPC discussed with pt PLPC will come back at a later time and date to discuss group.

## 2024-02-22 NOTE — PLAN OF CARE
Problem: Adult Behavioral Health Plan of Care  Goal: Optimized Coping Skills in Response to Life Stressors  2/22/2024 1146 by Debbie Erwin LPC  Outcome: Ongoing, Progressing     PROCESS GROUP:    Pt is isolating in room, in bed refusing to attend group.[facility] provided 1:1 with pt to dicuss group. Patient states he felt tired. Pt presented disheveled, restless, depressed, withdrawn,irritable. Staff encouraged pt to meeet with staff at a later time to initatie group discussion.

## 2024-02-22 NOTE — PLAN OF CARE
Pt anxious at times, cooperative, denies SI at this time, pt restless, avoiding social contact, isolating to room, up for meals and snacks, safety precautions maintained, will continue to monitor
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
## 2024-02-22 NOTE — NURSING
Pt sleeping at this time, slept 6.5 hrs with 5 awakenings. NAD. Resp even and unlabored.Pathways clear,bed in low position. Q 15 min safety check ongoing.All precautions maintained.

## 2024-02-23 PROCEDURE — 90833 PSYTX W PT W E/M 30 MIN: CPT | Mod: ,,, | Performed by: STUDENT IN AN ORGANIZED HEALTH CARE EDUCATION/TRAINING PROGRAM

## 2024-02-23 PROCEDURE — 99233 SBSQ HOSP IP/OBS HIGH 50: CPT | Mod: ,,, | Performed by: STUDENT IN AN ORGANIZED HEALTH CARE EDUCATION/TRAINING PROGRAM

## 2024-02-23 PROCEDURE — S4991 NICOTINE PATCH NONLEGEND: HCPCS | Performed by: STUDENT IN AN ORGANIZED HEALTH CARE EDUCATION/TRAINING PROGRAM

## 2024-02-23 PROCEDURE — 25000003 PHARM REV CODE 250: Performed by: STUDENT IN AN ORGANIZED HEALTH CARE EDUCATION/TRAINING PROGRAM

## 2024-02-23 PROCEDURE — 11400000 HC PSYCH PRIVATE ROOM

## 2024-02-23 RX ADMIN — NALTREXONE HYDROCHLORIDE 50 MG: 50 TABLET, FILM COATED ORAL at 12:02

## 2024-02-23 RX ADMIN — OLANZAPINE 10 MG: 10 TABLET, FILM COATED ORAL at 08:02

## 2024-02-23 RX ADMIN — ONDANSETRON 4 MG: 4 TABLET, ORALLY DISINTEGRATING ORAL at 04:02

## 2024-02-23 RX ADMIN — DIAZEPAM 10 MG: 10 TABLET ORAL at 08:02

## 2024-02-23 RX ADMIN — NICOTINE 1 PATCH: 21 PATCH, EXTENDED RELEASE TRANSDERMAL at 08:02

## 2024-02-23 RX ADMIN — THERA TABS 1 TABLET: TAB at 08:02

## 2024-02-23 RX ADMIN — Medication 100 MG: at 08:02

## 2024-02-23 RX ADMIN — HYDROXYZINE HYDROCHLORIDE 50 MG: 25 TABLET, FILM COATED ORAL at 08:02

## 2024-02-23 RX ADMIN — Medication 6 MG: at 06:02

## 2024-02-23 RX ADMIN — DIAZEPAM 10 MG: 10 TABLET ORAL at 02:02

## 2024-02-23 RX ADMIN — FOLIC ACID 1 MG: 1 TABLET ORAL at 08:02

## 2024-02-23 NOTE — PLAN OF CARE
Following POC.  Makes needs known.  Denies SI/HI, AVH.  Isolated in his bed all evening.  Meds brought to his room.  States he is not feeling good.  Paucity of speech and thought.  Appetite is ok.  Medication compliance.  Encouraged out of room activities.  Free of fall.

## 2024-02-23 NOTE — PROGRESS NOTES
PSYCHIATRY DAILY INPATIENT PROGRESS NOTE  SUBSEQUENT HOSPITAL VISIT    ENCOUNTER DATE: 2/23/2024  SITE: [facility]    DATE OF ADMISSION: 2/19/2024 12:57 PM  LENGTH OF STAY: 4 days      CHIEF COMPLAINT   Ismael Gordon Jr. is a 33 y.o. male, seen during daily white rounds on the inpatient unit.  Ismael Gordon Jr. presented with the chief complaint of depression, anxiety, and substance problems      The patient was seen and examined. The chart was reviewed.     Reviewed notes from Rns, CTRS, and LPC and labs from the last 24 hours.    The patient's case was discussed with the treatment team/care providers today including Rns    Staff reports no behavioral or management issues.     The patient has been compliant with treatment.      Subjective 02/23/2024      Detox is lessening today, but did vomit last night. Did not eat yet today due to GI sx.     He remains motivated for sobriety. Discussed his long term goals with treatment team today.     He declines rehab but willing to attend outpatient care.    He does feel medications are helping. Discussed relapse prevention.    The patient denies any side effects to medications.        Interim/overnight events per report/notes:    Per RN:  Patient blunted affect and depressed mood. Patient irritable at times. No S/S of detoxPatient reports on a scale of 1-10 with ten being the highest level of depression. Patient rating depression today a 8/10. On the same scale patient rating anxiety a 9/10.         Psychiatric ROS (observed, reported, or endorsed/denied):  Depressed mood - less  Interest/pleasure/anhedonia: less  Guilt/hopelessness/worthlessness - less  Changes in Sleep - fluctuating  Changes in Appetite - fluctuating  Changes in Concentration - fluctuating  Changes in Energy - fluctuating  PMA/R- less  Suicidal- active/passive ideations - No  Homicidal ideations: active/passive ideations - No    Hallucinations - less  Delusions - less  Disorganized  "behavior - No  Disorganized speech - No  Negative symptoms - No    Elevated mood - No  Decreased need for sleep - No  Grandiosity - No  Racing thoughts - No  Impulsivity - No  Irritability- No  Increased energy - No  Distractibility - No  Increase in goal-directed activity or PMA- No    Symptoms of KAILYN - fluctuating  Symptoms of Panic Disorder- No  Symptoms of PTSD - No        Overall progress: Patient is showing mild improvement       Psychotherapy:  Target symptoms: substance abuse, psychosis  Why chosen therapy is appropriate versus another modality: relevant to diagnosis  Outcome monitoring methods: self-report  Therapeutic intervention type: supportive psychotherapy  Topics discussed/themes: building skills sets for symptom management, symptom recognition, substance abuse  The patient's response to the intervention is accepting. The patient's progress toward treatment goals is fair.   Duration of intervention: 16 minutes.        Medical ROS  Review of Systems   Constitutional:  Negative for chills and fever.   HENT:  Negative for hearing loss.    Eyes:  Negative for blurred vision and double vision.   Respiratory:  Negative for shortness of breath.    Cardiovascular:  Negative for chest pain and palpitations.   Gastrointestinal:  Negative for constipation, diarrhea, nausea and vomiting.   Genitourinary:  Negative for dysuria.   Musculoskeletal:  Negative for back pain and neck pain.   Skin:  Negative for rash.   Neurological:  Negative for dizziness and headaches.   Endo/Heme/Allergies:  Negative for environmental allergies.         PAST MEDICAL HISTORY   Past Medical History:   Diagnosis Date    Bipolar 1 disorder     Depression     Hepatitis C     History of psychiatric hospitalization     "It has all been to get to previous 28 day programs."    Hx of psychiatric care     Psychiatric exam requested by authority     Psychiatric problem     Suicide attempt 01/16/2017    'I did an overdose on heroin so you could " "say passive suicide in my opinion."    Therapy            PSYCHOTROPIC MEDICATIONS   Scheduled Meds:   diazePAM  10 mg Oral After lunch    diazePAM  10 mg Oral Daily    folic acid  1 mg Oral Daily    mirtazapine  15 mg Oral QHS    multivitamin  1 tablet Oral Daily    naltrexone  50 mg Oral After lunch    nicotine  1 patch Transdermal Daily    OLANZapine  10 mg Oral QHS    thiamine  100 mg Oral Daily     Continuous Infusions:  PRN Meds:.acetaminophen, aluminum-magnesium hydroxide-simethicone, dicyclomine, hydrOXYzine HCL, ibuprofen, loperamide, melatonin, methocarbamoL, OLANZapine **AND** OLANZapine, ondansetron        EXAMINATION    VITALS   Vitals:    02/22/24 0734 02/22/24 0851 02/22/24 1943 02/23/24 0726   BP: 117/73  127/67 124/67   BP Location: Right arm  Left arm Left arm   Patient Position: Sitting  Sitting Lying   Pulse: 86  65 65   Resp: 16  18 18   Temp: 97 °F (36.1 °C) 97.2 °F (36.2 °C) 98.9 °F (37.2 °C) 99 °F (37.2 °C)   TempSrc: Temporal  Temporal Temporal   Weight:       Height:           Body mass index is 23.79 kg/m².        CONSTITUTIONAL  General Appearance: unremarkable, age appropriate    MUSCULOSKELETAL  Muscle Strength and Tone:no tremor, no tic  Abnormal Involuntary Movements: No  Gait and Station: non-ataxic    PSYCHIATRIC   Level of Consciousness: awake and alert   Orientation: person, place, and situation  Grooming: Casually dressed and Well groomed  Psychomotor Behavior: normal, cooperative  Speech: normal tone, normal rate, normal pitch, normal volume  Language: grossly intact  Mood: better  Affect: Consistent with mood  Thought Process: linear, logical  Associations: intact   Thought Content: +delusions, denies SI, and denies HI  Perceptions: less AH and denies  VH  Memory: Able to recall past events, Remote intact, and Recent intact  Attention:Attends to interview without distraction  Fund of Knowledge: Aware of current events and Vocabulary appropriate   Estimate if Intelligence:  " Average based on work/education history, vocabulary and mental status exam  Insight: has awareness of illness  Judgment: behavior is adequate to circumstances        DIAGNOSTIC TESTING   Laboratory Results  No results found for this or any previous visit (from the past 24 hour(s)).        MEDICAL DECISION MAKING          ASSESSMENT         MDD, recurrent, severe  Unspecified anxiety disorder  Amphetamine abuse  Opiate abuse  Nicotine dependence           PROBLEM LIST AND MANAGEMENT PLANS         MDD, recurrent, severe  - continue increased remeron 7.5 mg PO qhs to 15 mg PO qhs  - continue zyprexa 10 mg PO qhs   - pt counseled  - follow up with outpatient mental health providers after discharge for medication management and psychotherapy     Unspecified anxiety disorder  - continue hydroxyzine 50 mg PO q 6 hours PRN  - pt counseled  - follow up with outpatient mental health providers after discharge for medication management and psychotherapy     Amphetamine abuse  - continue valium 10 mg PO TID for detox to assist with agitation  -decrease to BID   - pt counseled  - SW consulted for assistance with additional resources   - consider rehab     Opiate abuse  - pt requesting vivitrol, will plan to administer prior to discharge once detox controlled if PO challenge successful  - start naltrexone 50 mg PO qd today  - prns for sx w/d  - pt counseled  - SW consulted for assistance with additional resources   - consider rehab       Nicotine dependence  - continue nicotine patch 14 mg PO qd PRN        Discussed diagnosis, risks and benefits of proposed treatment vs alternative treatments vs no treatment, potential side effects of these treatments and the inherent unpredictability of treatment. The patient expresses understanding of the above and displays the capacity to agree with this treatment given said understanding. Patient also agrees that, currently, the benefits outweigh the risks and would like to pursue/continue  treatment at this time.    Any medications being used off-label were discussed with the patient inclusive of the evidence base for the use of the medications and consent was obtained for the off-label use of the medication.       DISCHARGE PLANNING  Expected Disposition Plan: Home or Self Care, anticipate stability for discharge after vivitrol IM on Sunday      NEED FOR CONTINUED HOSPITALIZATION  Psychiatric illness continues to pose a potential threat to life or bodily function, of self or others, thereby requiring the need for continued inpatient psychiatric hospitalization: Yes, due to: danger to self and gravely disabled, as evidenced by:  Ongoing concerns with grave disability with patient unable to perform basic feeding, hygiene and dressing activities without significant constant support.    Protective inpatient pyschiatric hospitalization required while a safe disposition plan is enacted: Yes    Patient stabilized and ready for discharge from inpatient psychiatric unit: No        STAFF:   Manolo Mendoza III, MD  Psychiatry

## 2024-02-23 NOTE — PSYCH
Ismael Gordon Jr. MRN:[id number] ([facility]#[id number]) (33 y.o. M) (Adm: 24)  [facility] [id number]  Patient Demographics    Patient Name  Ismael Gordon Jr. Legal Sex  Male          Age  1990 (33 y.o.) [facility]   Address  [address]  [facility] [address] Phone  [phone] (Home)  [phone] (Mobile) *Preferred*     Patient Demographics    Address  [address] [place] [address] Phone  [phone] (Home)  [phone] (Mobile) *Preferred* E-mail Address  [email or web address]     PCP and Center    Primary Care Provider  Primary Doctor No Center  None     Patient Contacts    Name Relation Home Work Mobile   Ismael Gordon [phone]   Yessy Gordon Mother [phone]     Documents on File     Status Date Received Description   Documents for the Patient   Notice of Privacy Pract Ackn RISSA Received 16 HIPAA/SELF   Insurance Documents Received 18 MDCAID   Patient ID Received () 16 LADL    Clinic Authorization RISSA      Provider Based Acknowledgement RISSA      Disclosure Agreement Accepted 16    Outside Psychiatric Documentation Received 17    Release of Information Received 17    Notice of Privacy Pract Ackn Signed 18 HIPAA/Self   Clinic Authorization Signed () 18 Consent/Self   Consents      Patient ID Received () 18 [place] D/L 22   DME Prescription   shoulder imm.   Advance Beneficiary Notice Received () 22    Notice of Privacy Practice [facility] Signed 20 HIPAA   Advance Directive Acknowledgement Received 21 psda   Provider Based Acknowledgement      Plain Language Summary English Acknowledged () 21    Plain Language Summary English Acknowledged () 21    Involvement In Care      [facility]ael Contracted Facility Disclosure Signed 21    [facility] Contracted Facility Disclosure Contracted 21 [facility]   Release of  Information Received 21    Plain Language Summary English Acknowledged () 22    Release of Information Received 22    Patient ID      Plain Language Summary English Acknowledged () 23    Plain Language Summary English Acknowledged () 24    [facility] Not Contracted Facility Disclosure Signed 24    Advance Directive Acknowledgement Received 24    [facility] Not Contracted Facility Disclosure  (Deleted)     [facility] Contracted Facility Disclosure  (Deleted)     [facility] Contracted Facility Disclosure Unable to Obtain (Deleted) 22    [facility] Release of Information Output  23 Requested records   Documents for the Encounter   Medicare Lifetime Reserve Form      Important Medicare Message Printed at Discharge      Advance Beneficiary Notice      Hospital Authorization Received 24    Clinical References Attachment   Tips for How to Help Your Mood (English)   Clinical References Attachment   Drug Abuse and Drug Addiction Discharge Instructions (English)   Clinical References Attachment   Opioid Overdose Discharge Instructions (English)   Clinical References Attachment   Quitting Smoking ED (English)   Flowsheets Nursing Received 24      Admission Information    Current Information    Attending Provider Admitting Provider Admission Type Admission Status   Manolo Mendoza III, MD Orgeron, Aubrey III, MD Urgent Confirmed Admission          Admission Date/Time Discharge Date Hospital Service Auth/Cert Status   24  1257  Psychiatry Incomplete          Hospital Area Unit Room/Bed    [facility] UN[facility] 211/211              Admission    Complaint        Hospital Account    Name Acct ID Class Status Primary Coverage   Ismael Gordon Jr. [id number] IP- Psych Open None          Guarantor Account (for Hospital Account #[id number])    Name Relation to Pt Service Area Active? Acct Type   Ismael Gordon Jr. Self [facility] Yes Behavioral  Health   Address Phone     [address]  [place] [address] [phone](H)            Coverage Information (for Hospital Account #[id number])    Not on file

## 2024-02-23 NOTE — PROGRESS NOTES
02/23/24 1050   [facility] Group Therapy   Group Name Other  (1:1 attempt)   Participation Level None   Participation Quality Sleeping     Patient resting in bed with his eyes closed and did not give a verbal response when approached. CTRS was advised to allow the patient to rest

## 2024-02-23 NOTE — PLAN OF CARE
Pt is cooperative.  Somewhat restless and anxious but states he feels much better.  Denies any detox symptoms at this time.  Denies any S/I or H/I at this time.  Thought process a little paranoid and delusional.  Treatment compliant.  Denies  N/V at this time.  No acute distress apparent at this time, will continue to monitor.

## 2024-02-23 NOTE — PLAN OF CARE
Problem: Adult Behavioral Health Plan of Care  Goal: Optimized Coping Skills in Response to Life Stressors  Outcome: Ongoing, Progressing    Group Process:    Pt did not attend group. PLPC advised by RN to allow pt to rest in bed at this time. RN reports pt is slightly irritable. Pt is observed for safety by staff.

## 2024-02-23 NOTE — PLAN OF CARE
Problem: Adult Behavioral Health Plan of Care  Goal: Rounds/Family Conference  Outcome: Ongoing, Progressing  Flowsheets (Taken 2/23/2024 0911)  Participants:   psychiatrist   nursing   other ([facility])   TREATMENT TEAM      Chief Complaint:    Pt is a 33 year old male with chief complaints of depression, anxiety, and substance problems. Pt states he has a drug problem and came in for help and now that he has the right medications he is fine        Pt Goal(s):    Pt states he would like to find a  and live a happy and boring life with a family that he wants to have.    Current Progress:   Pt attended treatment team dressed in personal clothing    Pt affect anxious/tearful mood    Pt states he ate last night, but he did vomit due to begin nausea from the detox. He states he is about to eat now.  Pt states he slept good and states what every medications he was given last night is working and he is not hearing any voices.    Pt is not HI or SI.  Program/groups:    Encourage pt to attend all groups. Pt has been irritable at times and  staff was advised to leave pt alone due to irritability.      Revisions to Plan:  Encourage pt to attend treatment team and to attend all groups. Recommendations are for pt to attend rehab. Pt refused rehab. A Other recommendations are for pt to attend psychotherapy and medication management upon discharge.       Female Adult Wellness Exam      CHIEF COMPLAINT:   Chief Complaint   Patient presents with   â¢ [facility] Care   â¢ Physical       HISTORY OF PRESENT ILLNESS:   Steffany Pride is a 21year old female  0 Para 0 presenting for physical.   Last pap: n/a   Mammogram: n/a  Family history breast cancer n/a  Bowel: 1 per day  Colonoscopy/Cologuard: n/a    Family history bowel cancer: no  Water 4 glasses  Caffeine: n/a  Exercise:  minimal exercise  Tetanus: unsure  Flu: refused today  Concerns: None at this time          Past Medical History:  History reviewed. No pertinent past medical history. Past Surgical History:  History reviewed. No pertinent surgical history. Current Medications:  No current outpatient prescriptions on file. No current facility-administered medications for this visit. Allergies:  ALLERGIES:  Not on File    SOCIAL HISTORY:  Social History   Substance Use Topics   â¢ Smoking status: Never Smoker   â¢ Smokeless tobacco: Never Used   â¢ Alcohol use No         Drug Use:    No                FAMILY HISTORY:  Family History   Problem Relation Age of Onset   â¢ Hypertension Mother    â¢ Diabetes Maternal Grandmother    â¢ Heart Maternal Grandfather    â¢ Diabetes Paternal Grandmother    â¢ Heart Paternal Grandfather          REVIEW OF SYSTEMS:  CONSTITUTIONAL: No Fevers/Chills/Weight loss/Fatigue. EYES: No Visual changes. HEAD/EARS/NOSE/THROAT/MOUTH: No Headache/Tinnitus/Hearing changes/Hoarseness. NECK: No Swelling/Masses/Neck pain/Neck stiffness. RESPIRATORY: No Cough/Sputum/Hemoptysis/Shortness of breath/Wheezing. CARDIOVASCULAR: No Chest pain/Dyspnea on exertion/Syncope/Palpitations/Diaphoresis/Lightheadedness. GASTROINTESTINAL: No Abdominal pain/Nausea/Vomiting/Diarrhea/Change in bowel habits. GENITOURINARY: No Dysuria/Frequency/Urgency/Hematuria/Incontinence/Urinary retention/Vaginal discharge/Dyspareunia/Menstrual irregularities.   INTEGUMENTARY: No "Rashes/Pruritus/Lumps. ENDOCRINE: No Heat or Cold intolerance/Hair or Nail changes/Polydipsia/Polyuria. HEMATOLOGIC/LYMPHATIC: No Easy bruising/Easy bleeding/Enlarged lymph nodes. MUSCULOSKELETAL: No Joint pain/Joint swelling/Joint stiffness/Back pain. NEUROLOGICAL: No Numbness/Tingling/Weakness/Tremor. PSYCHIATRIC: No Depression/Anxiety/Panic attacks. PHYSICAL EXAMINATION:  Visit Vitals  /76   Pulse 75   Temp 98.3 Â°F (36.8 Â°C) (Tympanic)   Resp 14   Ht 5' 3.5"" (1.613 m)   Wt 52.3 kg   LMP 01/05/2018   SpO2 99%   BMI 20.09 kg/mÂ²       CONSTITUTIONAL: No acute distress, Non-toxic appearing. EYES: Pupils equal, round, reactive to light and accommodation, Extraocular movements intact, Conjunctivae are clear/pink - no signs of inflammation, Lids are normal, No scleral icterus. HEAD, EARS, NOSE, MOUTH, THROAT: Normocephalic/Atraumatic, External exam of nose and ears is normal- no lesions/masses/tenderness, External auditory canals normal bilaterally, Tympanic membranes are normal bilaterally with no erythema or bulging and no effusions or discharge- normal light reflex, Nasal mucosa is pink with no sign erythema/inflammation and no discharge seen, Oral mucosa, Hard/Soft palates, and Posterior pharynx are normal with no sign of Exudate/Erythema/Petechiae/Post-nasal drip, Moist mucous membranes. NECK: Supple, Normal range of motion, Overall normal appearance, No masses/swelling, Trachea midline, Thyroid normal size and consistency- no thyromegaly or nodules/lesions appreciated. BREAST: Breasts and nipples are symmetric and normal size. Nipples are everted. No overlying skin changes. No nipple discharge/masses/lumps/tenderness bilaterally. RESPIRATORY: Clear to auscultation bilaterally, Good air entry, Symmetrical expansion with respiration. No accessory muscle use/retractions, No Wheezes/Rhonchi. CARDIOVASCULAR: Regular rate and rhythm. Normal S1/S2, No S3/S4, No Murmurs/Rubs/Gallops.  No lower " extremity edema or varicosities. Posterior tibial and Dorsalis pedis pulses 2+ bilaterally. GASTROINTESTINAL:  Soft, Nontender, Non-distended, Bowel sounds present, No Hepatomegaly/Splenomegaly, No Masses or Hernias, No Rigidity/Guarding/Rebound tenderness  GENITOURINARY: External vulvovaginal region, labia, clitoris, periurethral area, urethral orifice, and vaginal canal are clear. Normal vaginal mucosa, no discharge seen. Cervix is pink with no discharge or lesions seen. No cervical motion tenderness. Pap obtained. Bimanual Exam reveals anterior uterus which is midline, normal size, shape, consistency and mobility. Adnexal regions without masses or tenderness. LYMPHATIC: No Lymphadenopathy in Neck/Axillae. MUSCULOSKELETAL: Gait and Station normal, No Cyanosis/Clubbing/Edema, Capillary Refill less than 2 seconds. SKIN: Warm, Dry, Inspection normal with no Rashes/Lesions/Ulcers. NEUROLOGIC: Cranial nerves II-XII grossly intact, Strength 5/5 bilaterally, Sensation grossly intact by touch, Reflexes 2+ bilaterally. PSYCHIATRIC: The patient is well-nourished and well-groomed. Alert & Oriented times 3. Able to articulate well with normal speech/language, rate, volume and coherence. Recent and remote memory intact. The patient's mood and affect are described as not anxious or depressed. Associations are intact. Judgment and insight are appropriate concerning matters relevant to self. ASSESSMENT AND PLAN:    (Z01.419) Well woman exam with routine gynecological exam  (primary encounter diagnosis)  Plan: Overall feeling well. Provided preventative care and routine anticipatory guidance including advance directives, diet, exercise, and appropriate screening tests. All questions answered. Immunizations reviewed and updated- see below. Patient to follow up annually. Patient verbalized understanding and agreed.          (Z13.220) Need for lipid screening  Plan: LIPID PANEL WITH REFLEX         (Z13.1) Screening for diabetes mellitus (DM)  Plan: GLUCOSE LEVEL            (Z12.4) Screening for malignant neoplasm of cervix  Plan: THINPREP PAP TEST WITH HPV REFLEX           (Z23) Need for vaccination  Plan: TETANUS DIPHTHERIA ACELLULAR PERTUSSIS [facility],         11+ YRS (ADACEL)                 [address] [facility].  MD Hernandez

## 2024-02-23 NOTE — PSYCH
[facility] discussed collateral consent with pt. Pt states he would prefer not to contact anyone at this time.

## 2024-02-24 PROCEDURE — 90833 PSYTX W PT W E/M 30 MIN: CPT | Mod: ,,, | Performed by: PSYCHIATRY & NEUROLOGY

## 2024-02-24 PROCEDURE — 11400000 HC PSYCH PRIVATE ROOM

## 2024-02-24 PROCEDURE — 25000003 PHARM REV CODE 250: Performed by: STUDENT IN AN ORGANIZED HEALTH CARE EDUCATION/TRAINING PROGRAM

## 2024-02-24 PROCEDURE — 25000003 PHARM REV CODE 250: Performed by: PSYCHIATRY & NEUROLOGY

## 2024-02-24 PROCEDURE — S4991 NICOTINE PATCH NONLEGEND: HCPCS | Performed by: STUDENT IN AN ORGANIZED HEALTH CARE EDUCATION/TRAINING PROGRAM

## 2024-02-24 PROCEDURE — 99233 SBSQ HOSP IP/OBS HIGH 50: CPT | Mod: ,,, | Performed by: PSYCHIATRY & NEUROLOGY

## 2024-02-24 RX ORDER — NALTREXONE HYDROCHLORIDE 50 MG/1
50 TABLET, FILM COATED ORAL ONCE
Status: COMPLETED | OUTPATIENT
Start: 2024-02-24 | End: 2024-02-24

## 2024-02-24 RX ADMIN — DIAZEPAM 10 MG: 10 TABLET ORAL at 08:02

## 2024-02-24 RX ADMIN — NICOTINE 1 PATCH: 21 PATCH, EXTENDED RELEASE TRANSDERMAL at 08:02

## 2024-02-24 RX ADMIN — THERA TABS 1 TABLET: TAB at 08:02

## 2024-02-24 RX ADMIN — NALTREXONE HYDROCHLORIDE 50 MG: 50 TABLET, FILM COATED ORAL at 12:02

## 2024-02-24 RX ADMIN — HYDROXYZINE HYDROCHLORIDE 50 MG: 25 TABLET, FILM COATED ORAL at 06:02

## 2024-02-24 RX ADMIN — Medication 100 MG: at 08:02

## 2024-02-24 RX ADMIN — FOLIC ACID 1 MG: 1 TABLET ORAL at 08:02

## 2024-02-24 RX ADMIN — MIRTAZAPINE 15 MG: 15 TABLET, FILM COATED ORAL at 08:02

## 2024-02-24 RX ADMIN — Medication 6 MG: at 08:02

## 2024-02-24 RX ADMIN — OLANZAPINE 10 MG: 10 TABLET, FILM COATED ORAL at 08:02

## 2024-02-24 RX ADMIN — IBUPROFEN 800 MG: 800 TABLET, FILM COATED ORAL at 06:02

## 2024-02-24 NOTE — PLAN OF CARE
"Patient reports feeling "okay" today, spending majority of time in room isolated with minimal interaction with staff and peers. Patient vomiting intermittently. Zofran administered at 16:57 to aid with nausea. Patient denies any other signs or symptoms. Denies SI/HI. Denies hallucinations. Decreased appetite due to nausea. Remains calm and cooperative. NADN. Safety precautions remain in place.   "

## 2024-02-24 NOTE — PLAN OF CARE
Patient tearful and frustrated once confronted with the realities in his possible change of plan of care.    Patient received 'challenge test' of naltrexone 50 mg po; patient had adverse effects of shakiness, nausea, and vomiting; patient required Zofran for multiple vomiting episodes last night.  Patient explained and educated on the 'important information' from the Vivitrol medication guide and parameters for receiving the injection.    Patient reluctantly agreeable of new treatment plan with extend days in the hospital after being educated on the severity of adverse effects of receiving the Vivitrol IM injections sooner than the recommended parameters.  Denies intentions to harm self and/or others at this time. Denies auditory and/or visual hallucinations.  Affect and emotion congruent with situation.  Thoughts are linear, logical, and reasonable.  Minimal interactions with peers and staff; mostly isolating in bed.  Accepts meals and medications.  Discussed medication and plan of care as well as healthy coping skills and techniques; patient requires continued education and reinforcement.    Naltrexone 'challenge' continued/resumed today s/p team discussion with patient and psychiatrist; 50 mg given at 1 pm.  Patient began to display s/s of withdrawal approx. 40 min after administration with clamminess, nausea and vomiting, and shakiness.  Patient denied the need/want for comfort meds at this time.    PRN Vistaril and Motrin given at 18:15 for runny nose, watery eyes, and body aches.

## 2024-02-24 NOTE — NURSING
Rested quietly with closed eyes and even resp for 6.5 hours.  Modified VC and all precautions maintained.  Pathways clear and bed in low position.

## 2024-02-24 NOTE — PROGRESS NOTES
OPIATE WITHDRAWAL (COWS)  Ismael Gordon Jr. [id number]  Date:  2/24/2024 12:09 PM    Resting Pulse Rate 0=<80 bpm  Sweating Over the last 30 min, not accounting for room temp  0=no chills or flushing  Restlessness 2  Pupil Size 1=pupils possibly larger than normal for room light  Bone or Joint Aches 0=None  Runny nose or tearing 0=None ++sneezing which   GI Upset   emesis last night  Tremor 3=moderate tremor observable  Yawning 0=none  Anxiety or Irritability 1=patient reports increasing irritability or anxiousness  Gooseflesh skin 0=skin is smooth  TOTAL COWS SCORE: 7 (Max 45)      0-4 Clinically Insignificant, 5-12 Mild, 13-24 Moderate, 25-36 Moderately Severe, > 36 Severe    Lizbet Figueroa MD

## 2024-02-25 PROCEDURE — 99233 SBSQ HOSP IP/OBS HIGH 50: CPT | Mod: ,,, | Performed by: PSYCHIATRY & NEUROLOGY

## 2024-02-25 PROCEDURE — 11400000 HC PSYCH PRIVATE ROOM

## 2024-02-25 PROCEDURE — 25000003 PHARM REV CODE 250: Performed by: STUDENT IN AN ORGANIZED HEALTH CARE EDUCATION/TRAINING PROGRAM

## 2024-02-25 PROCEDURE — S4991 NICOTINE PATCH NONLEGEND: HCPCS | Performed by: STUDENT IN AN ORGANIZED HEALTH CARE EDUCATION/TRAINING PROGRAM

## 2024-02-25 PROCEDURE — 63600175 PHARM REV CODE 636 W HCPCS: Mod: JZ,TB | Performed by: STUDENT IN AN ORGANIZED HEALTH CARE EDUCATION/TRAINING PROGRAM

## 2024-02-25 PROCEDURE — 25000003 PHARM REV CODE 250: Performed by: PSYCHIATRY & NEUROLOGY

## 2024-02-25 PROCEDURE — 63600175 PHARM REV CODE 636 W HCPCS: Performed by: PSYCHIATRY & NEUROLOGY

## 2024-02-25 RX ORDER — DIAZEPAM 10 MG/1
10 TABLET ORAL NIGHTLY
Status: COMPLETED | OUTPATIENT
Start: 2024-02-25 | End: 2024-02-25

## 2024-02-25 RX ADMIN — OLANZAPINE 10 MG: 10 INJECTION, POWDER, FOR SOLUTION INTRAMUSCULAR at 05:02

## 2024-02-25 RX ADMIN — Medication 100 MG: at 08:02

## 2024-02-25 RX ADMIN — HYDROXYZINE HYDROCHLORIDE 50 MG: 25 TABLET, FILM COATED ORAL at 03:02

## 2024-02-25 RX ADMIN — OLANZAPINE 10 MG: 10 TABLET, FILM COATED ORAL at 08:02

## 2024-02-25 RX ADMIN — OLANZAPINE 10 MG: 10 INJECTION, POWDER, FOR SOLUTION INTRAMUSCULAR at 08:02

## 2024-02-25 RX ADMIN — MIRTAZAPINE 15 MG: 15 TABLET, FILM COATED ORAL at 08:02

## 2024-02-25 RX ADMIN — DIAZEPAM 10 MG: 10 TABLET ORAL at 08:02

## 2024-02-25 RX ADMIN — METHOCARBAMOL TABLETS 500 MG: 500 TABLET, COATED ORAL at 03:02

## 2024-02-25 RX ADMIN — IBUPROFEN 800 MG: 800 TABLET, FILM COATED ORAL at 03:02

## 2024-02-25 RX ADMIN — CHLORPROMAZINE HYDROCHLORIDE 150 MG: 100 TABLET, FILM COATED ORAL at 10:02

## 2024-02-25 RX ADMIN — NICOTINE 1 PATCH: 21 PATCH, EXTENDED RELEASE TRANSDERMAL at 08:02

## 2024-02-25 RX ADMIN — FOLIC ACID 1 MG: 1 TABLET ORAL at 08:02

## 2024-02-25 RX ADMIN — Medication 6 MG: at 07:02

## 2024-02-25 RX ADMIN — THERA TABS 1 TABLET: TAB at 08:02

## 2024-02-25 NOTE — PROGRESS NOTES
PSYCHIATRY DAILY INPATIENT PROGRESS NOTE  SUBSEQUENT HOSPITAL VISIT    ENCOUNTER DATE: 2/25/2024  SITE: [facility]    DATE OF ADMISSION: 2/19/2024 12:57 PM  LENGTH OF STAY: 6 days      CHIEF COMPLAINT   Ismael Gordon Jr. is a 33 y.o. male, seen during daily white rounds on the inpatient unit.  Ismael Gordon Jr. presented with the chief complaint of depression, anxiety, and substance problems      The patient was seen and examined. The chart was reviewed.     Reviewed notes from Rns, [facility], and [facility] and labs from the last 24 hours.    The patient's case was discussed with the treatment team/care providers today including Rns    Staff reports no behavioral or management issues.     The patient has been compliant with treatment.      Subjective 02/25/2024    Patient interviewed, staff gave report, chart reviewed. Patient reports that he tired, that he wants to leave and if he cannot leave that he wants to sleep the day away. He is irritable, and appears uncomfortable, but no forthcoming about withdrawal symptoms. States he did sleep. Denies AVH, SI, HI. Denies drug cravings.      PRN Vistaril and Motrin given at 18:15 for runny nose, watery eyes, and body aches.    Medical ROS--he denies any though appears anxious, irritable and uncomfortable   Review of Systems   Constitutional:  Negative for chills and fever.   HENT:  Negative for hearing loss.    Eyes:  Negative for blurred vision and double vision.   Respiratory:  Negative for shortness of breath.    Cardiovascular:  Negative for chest pain and palpitations.   Gastrointestinal:  Negative for constipation, diarrhea, nausea and vomiting.   Genitourinary:  Negative for dysuria.   Musculoskeletal:  Negative for back pain and neck pain.   Skin:  Negative for rash.   Neurological:  Negative for dizziness and headaches.   Endo/Heme/Allergies:  Negative for environmental allergies.         PAST MEDICAL HISTORY   Past Medical History:   Diagnosis Date     "Bipolar 1 disorder     Depression     Hepatitis C     History of psychiatric hospitalization     "It has all been to get to previous 28 day programs."    Hx of psychiatric care     Psychiatric exam requested by authority     Psychiatric problem     Suicide attempt 01/16/2017    'I did an overdose on heroin so you could say passive suicide in my opinion."    Therapy            PSYCHOTROPIC MEDICATIONS   Scheduled Meds:   diazePAM  10 mg Oral QHS    folic acid  1 mg Oral Daily    mirtazapine  15 mg Oral QHS    multivitamin  1 tablet Oral Daily    nicotine  1 patch Transdermal Daily    OLANZapine  10 mg Oral QHS    thiamine  100 mg Oral Daily     Continuous Infusions:  PRN Meds:.acetaminophen, aluminum-magnesium hydroxide-simethicone, dicyclomine, hydrOXYzine HCL, ibuprofen, loperamide, melatonin, methocarbamoL, OLANZapine **AND** OLANZapine, ondansetron        EXAMINATION    VITALS   Vitals:    02/24/24 1938 02/25/24 0754 02/25/24 0800 02/25/24 1020   BP: 126/69 134/83  130/80   BP Location: Right arm Left arm  Right arm   Patient Position: Lying Lying  Sitting   Pulse: 70 67  91   Resp: 18 18  20   Temp: 98.3 °F (36.8 °C) 98.2 °F (36.8 °C)     TempSrc:  Temporal     Weight:   70.5 kg (155 lb 8.6 oz)    Height:           Body mass index is 22.32 kg/m².        CONSTITUTIONAL  General Appearance: unremarkable, age appropriate    MUSCULOSKELETAL  Muscle Strength and Tone:no tremor, no tic  Abnormal Involuntary Movements: No  Gait and Station: non-ataxic    PSYCHIATRIC   Level of Consciousness: awake and alert   Orientation: person, place, and situation  Grooming: Casually dressed and Well groomed  Psychomotor Behavior: normal, cooperative  Speech: normal tone, normal rate, normal pitch, normal volume  Language: grossly intact  Mood: fine  Affect: irritable, blunted  Thought Process: linear, logical  Associations: intact   Thought Content: denies delusions   Perceptions: denies AVH  Memory: Able to recall past events, " Remote intact, and Recent intact  Attention:Attends to interview without distraction  Fund of Knowledge: Aware of current events and Vocabulary appropriate   Estimate if Intelligence:  Average based on work/education history, vocabulary and mental status exam  Insight: has awareness of illness  Judgment: behavior is adequate to circumstances        DIAGNOSTIC TESTING   Laboratory Results  No results found for this or any previous visit (from the past 24 hour(s)).        MEDICAL DECISION MAKING          ASSESSMENT         MDD, recurrent, severe  Unspecified anxiety disorder  Amphetamine abuse  Opiate abuse  Nicotine dependence           PROBLEM LIST AND MANAGEMENT PLANS         MDD, recurrent, severe  - continue increased remeron 7.5 mg PO qhs to 15 mg PO qhs  - continue zyprexa 10 mg PO qhs   - pt counseled  - follow up with outpatient mental health providers after discharge for medication management and psychotherapy     Unspecified anxiety disorder  - continue hydroxyzine 50 mg PO q 6 hours PRN  - pt counseled  - follow up with outpatient mental health providers after discharge for medication management and psychotherapy     Amphetamine abuse  - continue valium 10 mg PO TID for detox to assist with agitation  -decrease to BID decreased to daily and now discontinue ---today restarted 10mg PO qhs for 1 dose  - pt counseled  - SW consulted for assistance with additional resources   - consider rehab     Opiate abuse  -Naltexone leads to opiate withdrawal. Treated with prn. Will add Valium 10mg PO qhs x 1 dose. Earlier he was agitated because of w/d and wanting to leave-at that time he was accepting of prn Zyprexa and 1 time dose of Thorazine 150mg PO  - pt requesting vivitrol  - start naltrexone 50 mg PO qd x 2 doses and each time precipated withdrawal. Not comfortable given Vivitrol  - prns for sx w/d  - pt counseled  - SW consulted for assistance with additional resources   - consider rehab       Nicotine  dependence  - continue nicotine patch 14 mg PO qd PRN        Discussed diagnosis, risks and benefits of proposed treatment vs alternative treatments vs no treatment, potential side effects of these treatments and the inherent unpredictability of treatment. The patient expresses understanding of the above and displays the capacity to agree with this treatment given said understanding. Patient also agrees that, currently, the benefits outweigh the risks and would like to pursue/continue treatment at this time.    Any medications being used off-label were discussed with the patient inclusive of the evidence base for the use of the medications and consent was obtained for the off-label use of the medication.       DISCHARGE PLANNING  Expected Disposition Plan: Home or Self Care, anticipate stability for discharge after vivitrol IM on Sunday      NEED FOR CONTINUED HOSPITALIZATION  Psychiatric illness continues to pose a potential threat to life or bodily function, of self or others, thereby requiring the need for continued inpatient psychiatric hospitalization: Yes, due to: danger to self and gravely disabled, as evidenced by:  Ongoing concerns with grave disability with patient unable to perform basic feeding, hygiene and dressing activities without significant constant support.    Protective inpatient pyschiatric hospitalization required while a safe disposition plan is enacted: Yes    Patient stabilized and ready for discharge from inpatient psychiatric unit: No        STAFF:   Lizbet Figueroa MD  Psychiatry

## 2024-02-25 NOTE — PLAN OF CARE
"Patient agitated, anxious, and impulsive; repeatedly states "I gotta get out of here. I can't do this. I don't want to have to do something for a shot. I don't want the shot [Vivitrol] anymore."  Disheveled appearance with 'detox' body odor; patient reports poor sleep last night.  Discussed with patient the health risk factors of discharging prematurely with naltrexone in his system and the detriments of using heroin; patient voiced understanding and became agreeable to IM Zyprexa.    PRN Zyprexa given at 8:30 am; minimal relief obtained s/p 1 hour. Patient remains irritable and restless.    Affect and emotion labile, irritable and tense.  Thoughts are illogical with pressured speech, but easily redirectable to understanding.  Isolates in bed; minimal interactions with peers and staff.  Accepts meals and medications.    PRN Thorazine 150 mg po given @10:22 am for continued anxiety, irritability, and restlessness.  Patient showered, bed sheets and pillow changed, room cleaned.  Encouraged patient on benefits of sobriety and comfort measures used to detox; patient hesitantly/reluctantly voiced understanding. Patient states "ok, I'm going to ride it out."    PRN Vistaril, Robaxin, and Ibuprofen given at 15:30.  Patient remains hyper focused on discharge tomorrow "to get back to work", but discussion with patient's girlfriend and mother confirmed that patient's job will wait for him to do whatever it takes to get clean and get him back to work.  All parties are supportive of patient in assisting with his sobriety.  Patient has been using 'getting back to work' as an excuse to be discharged to be able to use again.  Patient confirmed that he was going to use if discharged today without the IM.    Naltrexone challenge not resumed today d/t s/s of withdrawal.    PRN Zyprexa 10 mg IM given at 17:06.  "

## 2024-02-25 NOTE — PROGRESS NOTES
"PSYCHIATRY DAILY INPATIENT PROGRESS NOTE  SUBSEQUENT HOSPITAL VISIT    ENCOUNTER DATE: 2/24/2024  [facility]    DATE OF ADMISSION: 2/19/2024 12:57 PM  LENGTH OF STAY: 5 days      CHIEF COMPLAINT   Ismael Gordon Jr. is a 33 y.o. male, seen during daily white rounds on the inpatient unit.  Ismael Gordon Jr. presented with the chief complaint of depression, anxiety, and substance problems      The patient was seen and examined. The chart was reviewed.     Reviewed notes from Rns, CTRS, and LPC and labs from the last 24 hours.    The patient's case was discussed with the treatment team/care providers today including Rns    Staff reports no behavioral or management issues.     The patient has been compliant with treatment.      Subjective 02/24/2024      Detox is lessening today, but did vomit last night. Did not eat yet today due to GI sx.     He remains motivated for sobriety. Discussed his long term goals with treatment team today.     He declines rehab but willing to attend outpatient care at [facility]-meetings twice a week.     He does feel medications are helping. Discussed relapse prevention.    The patient denies any side effects to medications. He is fixated on discharge and therefore minimizes reported symptoms.     PSYCHOTHERAPY ADD-ON +08714   20 minutes  Why chosen therapy is appropriate versus another modality: relevant to diagnosis, patient responds to this modality, evidence based practice  Topic(s) of discussion substance abuse  Psychotherapeutic techniques: supportive psychotherapy, psychoeducation, motivational interviewing  Outcome monitoring methods: self-report, observation  Patient's response to intervention:  The patient's response to intervention is progressing     RN notes:  After 1st Naltrexone dose  Patient reports feeling "okay" today, spending majority of time in room isolated with minimal interaction with staff and peers. Patient vomiting intermittently. " "Zofran administered at 16:57 to aid with nausea. Patient denies any other signs or symptoms. Denies SI/HI. Denies hallucinations. Decreased appetite due to nausea. Remains calm and cooperative. NADN. Safety precautions remain in place.    Today after second Naltrexone PO dose:  Naltrexone 'challenge' continued/resumed today s/p team discussion with patient and psychiatrist; 50 mg given at 1 pm.  Patient began to display s/s of withdrawal approx. 40 min after administration with clamminess, nausea and vomiting, and shakiness.  Patient denied the need/want for comfort meds at this time.     PRN Vistaril and Motrin given at 18:15 for runny nose, watery eyes, and body aches.    Medical ROS--he denies any though appears anxious, shaky, unkept  Review of Systems   Constitutional:  Negative for chills and fever.   HENT:  Negative for hearing loss.    Eyes:  Negative for blurred vision and double vision.   Respiratory:  Negative for shortness of breath.    Cardiovascular:  Negative for chest pain and palpitations.   Gastrointestinal:  Negative for constipation, diarrhea, nausea and vomiting.   Genitourinary:  Negative for dysuria.   Musculoskeletal:  Negative for back pain and neck pain.   Skin:  Negative for rash.   Neurological:  Negative for dizziness and headaches.   Endo/Heme/Allergies:  Negative for environmental allergies.         PAST MEDICAL HISTORY   Past Medical History:   Diagnosis Date    Bipolar 1 disorder     Depression     Hepatitis C     History of psychiatric hospitalization     "It has all been to get to previous 28 day programs."    Hx of psychiatric care     Psychiatric exam requested by authority     Psychiatric problem     Suicide attempt 01/16/2017    'I did an overdose on heroin so you could say passive suicide in my opinion."    Therapy            PSYCHOTROPIC MEDICATIONS   Scheduled Meds:   diazePAM  10 mg Oral Daily    folic acid  1 mg Oral Daily    mirtazapine  15 mg Oral QHS    multivitamin  1 " tablet Oral Daily    nicotine  1 patch Transdermal Daily    OLANZapine  10 mg Oral QHS    thiamine  100 mg Oral Daily     Continuous Infusions:  PRN Meds:.acetaminophen, aluminum-magnesium hydroxide-simethicone, dicyclomine, hydrOXYzine HCL, ibuprofen, loperamide, melatonin, methocarbamoL, OLANZapine **AND** OLANZapine, ondansetron        EXAMINATION    VITALS   Vitals:    02/23/24 0726 02/23/24 1917 02/24/24 0015 02/24/24 0746   BP: 124/67 123/72 133/87 116/61   BP Location: Left arm Right arm  Right arm   Patient Position: Lying Lying  Lying   Pulse: 65 81 73 61   Resp: 18 20 20 18   Temp: 99 °F (37.2 °C) 97.3 °F (36.3 °C) 98 °F (36.7 °C) 98.5 °F (36.9 °C)   TempSrc: Temporal Temporal  Temporal   Weight:       Height:           Body mass index is 23.79 kg/m².        CONSTITUTIONAL  General Appearance: unremarkable, age appropriate    MUSCULOSKELETAL  Muscle Strength and Tone:no tremor, no tic  Abnormal Involuntary Movements: No  Gait and Station: non-ataxic    PSYCHIATRIC   Level of Consciousness: awake and alert   Orientation: person, place, and situation  Grooming: Casually dressed and Well groomed  Psychomotor Behavior: normal, cooperative  Speech: normal tone, normal rate, normal pitch, normal volume  Language: grossly intact  Mood: better  Affect: Consistent with mood  Thought Process: linear, logical  Associations: intact   Thought Content: denies delusions   Perceptions: denies AVH  Memory: Able to recall past events, Remote intact, and Recent intact  Attention:Attends to interview without distraction  Fund of Knowledge: Aware of current events and Vocabulary appropriate   Estimate if Intelligence:  Average based on work/education history, vocabulary and mental status exam  Insight: has awareness of illness  Judgment: behavior is adequate to circumstances        DIAGNOSTIC TESTING   Laboratory Results  No results found for this or any previous visit (from the past 24 hour(s)).        MEDICAL DECISION  MAKING          ASSESSMENT         MDD, recurrent, severe  Unspecified anxiety disorder  Amphetamine abuse  Opiate abuse  Nicotine dependence           PROBLEM LIST AND MANAGEMENT PLANS         MDD, recurrent, severe  - continue increased remeron 7.5 mg PO qhs to 15 mg PO qhs  - continue zyprexa 10 mg PO qhs   - pt counseled  - follow up with outpatient mental health providers after discharge for medication management and psychotherapy     Unspecified anxiety disorder  - continue hydroxyzine 50 mg PO q 6 hours PRN  - pt counseled  - follow up with outpatient mental health providers after discharge for medication management and psychotherapy     Amphetamine abuse  - continue valium 10 mg PO TID for detox to assist with agitation  -decrease to BID decreased to daily and now discontinue  - pt counseled  - SW consulted for assistance with additional resources   - consider rehab     Opiate abuse  - pt requesting vivitrol  - start naltrexone 50 mg PO qd today x 2 doses and each time precipated withdrawal. Not comfortable given Vivitrol  - prns for sx w/d  - pt counseled  - SW consulted for assistance with additional resources   - consider rehab       Nicotine dependence  - continue nicotine patch 14 mg PO qd PRN        Discussed diagnosis, risks and benefits of proposed treatment vs alternative treatments vs no treatment, potential side effects of these treatments and the inherent unpredictability of treatment. The patient expresses understanding of the above and displays the capacity to agree with this treatment given said understanding. Patient also agrees that, currently, the benefits outweigh the risks and would like to pursue/continue treatment at this time.    Any medications being used off-label were discussed with the patient inclusive of the evidence base for the use of the medications and consent was obtained for the off-label use of the medication.       DISCHARGE PLANNING  Expected Disposition Plan: Home or  Self Care, anticipate stability for discharge after vivitrol IM on Sunday      NEED FOR CONTINUED HOSPITALIZATION  Psychiatric illness continues to pose a potential threat to life or bodily function, of self or others, thereby requiring the need for continued inpatient psychiatric hospitalization: Yes, due to: danger to self and gravely disabled, as evidenced by:  Ongoing concerns with grave disability with patient unable to perform basic feeding, hygiene and dressing activities without significant constant support.    Protective inpatient pyschiatric hospitalization required while a safe disposition plan is enacted: Yes    Patient stabilized and ready for discharge from inpatient psychiatric unit: No        STAFF:   Lizbet Figueroa MD  Psychiatry

## 2024-02-25 NOTE — PLAN OF CARE
Isolating in room so far this shift.  Drowsy.  Not expressing feelings/issues well this shift.  Voicing needs.  Denies suicidal/homicidal thoughts at this time.  VS WNL.  Declined snack but drank 180cc gatorade.  Encouraged increased fluids.   Accepted hs medications.  Stressed compliance with prescribed medications upon discharge.

## 2024-02-26 VITALS
WEIGHT: 155.56 LBS | RESPIRATION RATE: 18 BRPM | SYSTOLIC BLOOD PRESSURE: 136 MMHG | HEIGHT: 70 IN | HEART RATE: 94 BPM | TEMPERATURE: 98 F | DIASTOLIC BLOOD PRESSURE: 88 MMHG | BODY MASS INDEX: 22.27 KG/M2

## 2024-02-26 PROCEDURE — 90833 PSYTX W PT W E/M 30 MIN: CPT | Mod: ,,, | Performed by: PSYCHIATRY & NEUROLOGY

## 2024-02-26 PROCEDURE — 99239 HOSP IP/OBS DSCHRG MGMT >30: CPT | Mod: ,,, | Performed by: PSYCHIATRY & NEUROLOGY

## 2024-02-26 PROCEDURE — S4991 NICOTINE PATCH NONLEGEND: HCPCS | Performed by: STUDENT IN AN ORGANIZED HEALTH CARE EDUCATION/TRAINING PROGRAM

## 2024-02-26 PROCEDURE — 25000003 PHARM REV CODE 250: Performed by: STUDENT IN AN ORGANIZED HEALTH CARE EDUCATION/TRAINING PROGRAM

## 2024-02-26 RX ORDER — NALTREXONE HYDROCHLORIDE 50 MG/1
25 TABLET, FILM COATED ORAL DAILY
Qty: 15 TABLET | Refills: 1 | Status: SHIPPED | OUTPATIENT
Start: 2024-02-26 | End: 2024-04-26

## 2024-02-26 RX ORDER — HYDROXYZINE HYDROCHLORIDE 50 MG/1
50 TABLET, FILM COATED ORAL 3 TIMES DAILY PRN
Qty: 90 TABLET | Refills: 1 | Status: SHIPPED | OUTPATIENT
Start: 2024-02-26

## 2024-02-26 RX ORDER — OLANZAPINE 10 MG/1
10 TABLET ORAL NIGHTLY
Qty: 30 TABLET | Refills: 1 | Status: SHIPPED | OUTPATIENT
Start: 2024-02-26 | End: 2025-02-25

## 2024-02-26 RX ORDER — MIRTAZAPINE 15 MG/1
15 TABLET, FILM COATED ORAL NIGHTLY
Qty: 30 TABLET | Refills: 1 | Status: SHIPPED | OUTPATIENT
Start: 2024-02-26 | End: 2025-02-25

## 2024-02-26 RX ORDER — IBUPROFEN 200 MG
1 TABLET ORAL DAILY
COMMUNITY
Start: 2024-02-26

## 2024-02-26 RX ORDER — MIRTAZAPINE 15 MG/1
15 TABLET, FILM COATED ORAL NIGHTLY
Qty: 30 TABLET | Refills: 1 | Status: SHIPPED | OUTPATIENT
Start: 2024-02-26 | End: 2024-02-26

## 2024-02-26 RX ADMIN — METHOCARBAMOL TABLETS 500 MG: 500 TABLET, COATED ORAL at 04:02

## 2024-02-26 RX ADMIN — HYDROXYZINE HYDROCHLORIDE 50 MG: 25 TABLET, FILM COATED ORAL at 04:02

## 2024-02-26 RX ADMIN — NICOTINE 1 PATCH: 21 PATCH, EXTENDED RELEASE TRANSDERMAL at 08:02

## 2024-02-26 RX ADMIN — Medication 100 MG: at 08:02

## 2024-02-26 RX ADMIN — THERA TABS 1 TABLET: TAB at 08:02

## 2024-02-26 RX ADMIN — FOLIC ACID 1 MG: 1 TABLET ORAL at 08:02

## 2024-02-26 NOTE — PSYCH
The patient has transitioned to outpatient treatment with [facility] , [address] [place] [address], [phone].  An appointment has been scheduled on 3/13/2024 at 1:20 pm. The patient will receive medication management, counseling and smoking cessation. The AVS was faxed on 2/26/2024 at 2:56 pm.

## 2024-02-26 NOTE — PSYCH
DISCHARGE INSTRUCTIONS  Ismael Gordon Jr. MRN: [id number]     Polysubstance use disorder   2/19/2024 - 2/26/2024   [facility]   Your Next Steps (Patient should check each box as tasks are completed)   Do       these medications from [id number] - [place], [place] - [address] AT [facility]  hydrOXYzine  mirtazapine  naltrexone  OLANZapine     these medications from any pharmacy  nicotine   Read      Read 7 attachments  Instructions    Your medications have changed   START taking:  hydrOXYzine (ATARAX)   naltrexone (DEPADE)   OLANZapine (ZyPREXA)    CHANGE how you take:  nicotine (NICODERM CQ)    STOP taking:  hydrOXYzine pamoate 50 MG Cap (VISTARIL)   Review your updated medication list below.  What's Next  What's Next         Go to [facility]  Wednesday Mar 13, 2024  as scheduled on March 13, 2024 at 1:20pm  for, medication management [address] [facility]  [place] [address]  [phone]     All Component Based Labs   02/21/24  0640  02/19/24  0838  02/19/24  0837     Benzodiazepines  Negative     Methadone metabolites  Negative     Phencyclidine  Negative     Acetaminophen Level   <3.0 Low     Albumin   4.1    Alcohol, Serum   <10    ALP   68    ALT   22    Amphetamines, Urine  Presumptive Positive Abnormal      Anion Gap   9    Appearance, UA  Clear     AST   17    Barbituates, Urine  Negative     Baso #   0.05    Basophil %   0.7    Bilirubin (UA)  1+ Abnormal      BILIRUBIN TOTAL   0.5    BUN   24 High     Calcium   9.7    Chlamydia, Amplified DNA Not Detected      Chloride   103    CO2   28    Cocaine, Urine  Negative     Color, UA  Yellow     Creatinine   0.9    Urine Creatinine  354.8     Differential Method   Automated    eGFR   >60    Eos #   0.2    Eos %   2.0    Glucose   126 High     Glucose, UA  Trace Abnormal      Gran # (ANC)   5.5    Gran %   72.9    Hematocrit   43.8    Hemoglobin   14.2    HIV Rapid Testing   Non-Reactive   "  Immature Grans (Abs)   0.04    Immature Granulocytes   0.5    Ketones, UA  Trace Abnormal      Leukocyte Esterase, UA  Negative     Lymph #   1.3    Lymph %   16.8 Low     MCH   27.7    MCHC   32.4    MCV   85    Mono #   0.5    Mono %   7.1    MPV   9.3    N gonorrhoeae, amplified DNA Not Detected      NITRITE UA  Negative     nRBC   0    Blood, UA  Negative     Opiates, Urine  Presumptive Positive Abnormal      pH, UA  6.0     Platelet Count   279    Potassium   3.6    PROTEIN TOTAL   7.7    Protein, UA  Negative     RBC   5.13    RDW   13.2    Salicylate Level   <5.0 Low     Sodium   140    Specific Gravity, UA  >=1.030 Abnormal      Specimen UA  Urine, Clean Catch     Marijuana (THC) Metabolite  Presumptive Positive Abnormal      Toxicology Information  SEE COMMENT     TSH   2.738    UROBILINOGEN UA  Negative     WBC   7.56                  Your care is important to us. If your provider recommended a follow-up appointment or test, we are happy to help you coordinate your recommended care. It is important that you complete your recommended follow-up.  If you need help scheduling, please call [phone]. Appointments can also be made online through the patient portal.      While scheduling and attending your appointments is your responsibility, our goal is to support and empower you throughout that process.         Your Diagnoses at Discharge   Polysubstance use disorder  Reason for Admission  Pt here to get off heroin  You are allergic to the following  You are allergic to the following  No active allergies     Your Latest Vitals    Blood Pressure 136/88       BMI 22.32       Weight 155 lb 8.6 oz       Height 5' 10"       Temperature (Temporal) 97.6 °F       Pulse 94       Respiration 18       BSA 1.87 m²  Treatment Team  Chat With All Treatment Team   Provider Role Specialty    Rachana Jeffers PA-C  Consulting Physician Hospitalist    Manolo Mendoza III, MD  Admitting Psychiatry     Recent Lab Values "   Include labs without results  Most Recent Result    A1C  5.2  04/29 2109  Most Recent 8 Results  Show dates as rows  Blood Glucose and Lipid Panel Labs   Include labs without results  No labs found from the past 365 days.  Other Studies   Active  Gonococcus, Amplified DNA     Completed  C. trachomatis/N. gonorrhoeae by AMP DNA [facility]; Urine     Pending Labs  Order Current Status   Gonococcus, Amplified DNA Collected (02/21/24 0641)     Pending Labs/Studies  You have no pending labs/studies.  Contact Information  Call [phone] (anytime, 7 days a week) to:  Report concerns regarding hospital stay  Ask questions about your hospital stay and home care plan  Get new or updated results of your lab tests and other procedures  [facility] On [facility]  [facility] Nurse Care Line - 24/7 Assistance  Unless otherwise directed by your provider, please contact [facility], our nurse care line that is available for 24/7 assistance. Please refer to the Patient Instructions section of your After Visit Summary for specific instructions from your physician.     Registered nurses in the [facility] Center provide appointment scheduling, clinical advisement, health education, and other advisory services.  Call: [phone] (toll free).  Advance Directives  An advance directive is a document which, in the event you are no longer able to make decisions for yourself, tells your healthcare team what kind of treatment you do or do not want to receive, or who you would like to make those decisions for you.  If you do not currently have an advance directive, [facility] encourages you to create one.  For more information call:  [phone]), [phone] ([phone]),  or log on to [email or web address].  Advance Directive Status  Flowsheet Row Most Recent Value   Advance Directive Status Patient does not have Advance Directive, declines information.   Advance Directive Type Does not have either a psychiatric or  "medical Advance Directive   Reason you declined to provide an Advance Directive Do not feel it is needed     Behavioral Health Safety Plan          Step 1: Warning Signs:     Pt stated" I use herion".     Pt stated" I get frustrated."     Pt stated" I et very anxious."       Step 2: Internal coping strategies - Things I can do to take my mind off my problems without contacting another person:     Pt stated" Ilike to read the bibile."     Pt stated" I try to meditate."     Pt stated" I like to read any kind of stories."       Step 3: People and social settings that provide distraction:     Name: Chiqui/friend Phone: In cell phone     Name: Ismael Suero Sr./father Phone: In cell phone     Place: Pt stated" I call her on the phone and talke to her."     Place: Pt stated" I talk to my dad on the phone."       Step 4: People whom I can ask for help:     Name: Ismael Suero Sr/father Phone: In cell phone     Name: Aman/friend Phone: In cell phone     Name: Orlando/brother Phone: In cell phone       Step 5: Professionals or agencies I can contact during a crisis:     Clinician Name: [facility] Phone: [phone]     Clinician Pager or Emergency Contact #: [phone]           Clinician Name: [employer] Phone: [phone]     Clinician Pager or Emergency Contact #: [phone]           Suicide Prevention Lifeline: 988 or 3-541-695-MJRX (8238)           Local Emergency Service: [place] [employer]     Emergency Services Address: [address] [place] [address]     Emergency Services Phone: [phone] [place] Crisis Line, [place] Line, [place] Crisis and Suicide Hotline       Step 6: Making the environment safer:     Pt stated" I need to stop the drugs, but I can not."   2. Pt stated " I am not sure at this time."         Used with permission from [employer]. (2011). Safety planning intervention: A brief intervention to mitigate suicide risk. Cognitive and " Behavioral Practice. 19, 738-264            Smoking Cessation  If you would like to quit smoking:  You may be eligible for free services if you are a [place] or [place] resident Call [facility] at ([phone].  Contact us via email: [email or web address]  View our website for more information: [email or web address]  Language Assistance Services  ATTENTION: Language assistance services are available, free of charge. Please call [phone].       ATENCIÓN: Si habla español, tiene a martinez disposición servicios gratuitos de asistencia lingüística. Llame al [phone].     CHÚ Ý: N?u b?n nói Ti?ng Vi?t, có các d?ch v? h? tr? ngôn ng? mi?n phí dành cho b?n. G?i s? [phone].  National Suicide Prevention Lifeline  If you or someone you know is thinking about suicide, call the National Suicide Prevention Lifeline using the 3 digit phone number of 815 or 0-993-265-PKAQ (8418).  The lifeline is free, confidential and always available.  Help a loved one, a friend or yourself.  www.suicidepreventionlifeline.org     Medication list    START taking these medications  START taking these medications    Additional info Begin Date AM Noon PM Bedtime    hydrOXYzine 50 MG tablet  Commonly known as: ATARAX  Quantity: 90 tablet  Refills: 1  Dose: 50 mg Take 1 tablet (50 mg total) by mouth 3 (three) times daily as needed for Anxiety. Take Vistaril 50mg (1 tablet) by mouth 3 times daily as needed for anxiety.        naltrexone 50 mg tablet  Commonly known as: DEPADE  Quantity: 15 tablet  Refills: 1  Dose: 25 mg Take 25 mg by mouth once daily.         OLANZapine 10 MG tablet  Commonly known as: ZyPREXA  Quantity: 30 tablet  Refills: 1  Dose: 10 mg Take 1 tablet (10 mg total) by mouth every evening.          CHANGE how you take these medications  CHANGE how you take these medications    Additional info Begin Date AM Noon PM Bedtime    * nicotine 14 mg/24 hr  Commonly known as: NICODERM CQ  Refills: 0  Dose: 1  patch  What changed: Another medication with the same name was added. Make sure you understand how and when to take each. Place 1 patch onto the skin once daily.         * nicotine 14 mg/24 hr  Commonly known as: NICODERM CQ  Refills: 0  Dose: 1 patch  What changed: You were already taking a medication with the same name, and this prescription was added. Make sure you understand how and when to take each. Place 1 patch onto the skin once daily.         very important  * This list has 2 medication(s) that are the same as other medications prescribed for you. Read the directions carefully, and ask your doctor or other care provider to review them with you.     CONTINUE taking these medications  CONTINUE taking these medications    Additional info Begin Date AM Noon PM Bedtime    mirtazapine 15 MG tablet  Commonly known as: REMERON  Quantity: 30 tablet  Refills: 1  Dose: 15 mg Take 1 tablet (15 mg total) by mouth every evening.          STOP taking these medications  STOP taking these medications   hydrOXYzine pamoate 50 MG Cap  Commonly known as: VISTARIL            Where to  your medications     these medications at [id number] - [place] - [address] AT [facility] [address] [facility]  hydrOXYzine  mirtazapine  naltrexone  OLANZapine  Address: [address] [place] [address] [address]   Phone: [phone]

## 2024-02-26 NOTE — NURSING
"Walking steadily in [facility] asking for something to sleep.  States, "I don't know why I have these shakes".  Hands noted shaky.  Pt flushed.  VS stable.  Accepted 150cc gatorade.  Encouraged fluids.  See MAR.  Voiced anxiety about taking the Vivitrol shot.  Said he'd talk to Dr about it this am.  Rested intermittently with closed eyes and even resp for 6.5 hours.  Modified VC and all precautions maintained.  Pathways clear and bed in low position.     "

## 2024-02-26 NOTE — NURSING
AVS instructed to pt on discharge information. Meds reviewed. Pt denies SI, HI, AVH. Mood WNL. Pt denies distress. Pt verbalized understanding on all d/c instructions. Pt escorted off unit downstairs with unit staff. NAD

## 2024-02-26 NOTE — PROGRESS NOTES
Psychotherapy:  Target symptoms: substance abuse, psychosis  Why chosen therapy is appropriate versus another modality: relevant to diagnosis  Outcome monitoring methods: self-report  Therapeutic intervention type: supportive psychotherapy  Topics discussed/themes: building skills sets for symptom management, symptom recognition, substance abuse  Safety planning and wrap up session  The patient's response to the intervention is accepting. The patient's progress toward treatment goals is fair.   Duration of intervention: 16 minutes.    Anshul Abraham MD  Psychiatry

## 2024-02-26 NOTE — PLAN OF CARE
Pt isolating in room so far this shift.  Blunted affect.  Refused snack but accepted po gatorade and hs medications.  VS stable.  Said he just wants to sleep until he can get shot and go home.  Drowsy.  Steady gait to bathroom.  Instructed to call for assistance if he needs anything.  Modified VC maintained.

## 2024-02-26 NOTE — DISCHARGE SUMMARY
"Discharge Summary  Psychiatry    Admit Date: 2/19/2024    Discharge Date and Time:  02/26/2024 9:12 AM    Attending Physician: Manolo Mendoza III, MD     Discharge Provider: Anshul Abraham MD    Reason for Admission:  depression, anxiety and substance abuse     History of Present Illness:   The patient presented to the ER on 2/19/2024 .     The patient was medically cleared and admitted to the [facility].     Per ED MD:     Pt arrives to the ed with c/o "I'm coming off heroin I did this morning and I want to detox from it. Yall always help me when I need to detox." Denies any withdrawal symptoms. Last use of heroin was this morning. Denies si/hi and hallucinations.      History of Present Illness  Ismael Gordon Jr. is a 33 y.o. male that presents with anxiety & depression  Patient was an active heroin abuser, last use was this morning on ER interview  Patient has been to the psychiatric unit several times with depression & anxiety  Patient is also asking for evaluation for drug addiction treatment this morning  Patient denies any suicidal ideation but clearly has chronic drug addiction HX  Additionally, the patient has an unmedicated bipolar patient, not psychotic now     Per RN:  Pt calm and cooperative during admission.  States he is here to detox off of heroin.  States he wishes to get on the vivitrol shot, either here or once he leaves here at his follow-up appointment.  Pt reports he last used heroin this morning.  States he was using meth and benzos as supplementary drugs when he would be out of heroin.  Pt does not want inpatient rehab, just to get on vivitrol shot.  Pt also request STD bloodwork while he is here.  Pt denies any S/I or H/I at this time.  Mood is good.  Pt states he feels good, doesn't expect to start having withdrawal symptoms until probably tomorrow.        Pt agitated saying he needs to get out of here. Ran in the nurses station behind a med student, trying to get out pulling on " "doors. Security called and pt ran back to his room. Talked with pt, he said he was sorry he just felt like he needed to leave. He is detoxing. Pt given IM zyprexa at 1045, pt tolerated well. Pt met with , will start benzo taper. Pt restless, will give new orders when available. Pt repeatedly apologized for his behavior.         Psychiatric interview:  Patient states he has been using methamphetamine and heroin. He uses IV. He states he typically uses meth once a week and heroin daily. He has been doing about 1 gram of heroin per day. He states he has been suffering from addiction for "years." He states his addiction makes him depressed and reports depression has been ongoing and worsening over the last year. Last use of heroin was yesterday. Now currently exhibiting and endorsing w/d symptoms.        Symptoms of Depression: diminished mood - Yes, loss of interest/anhedonia - Yes;  recurrent - Yes, >14 days - Yes, diminished energy - Yes, change in sleep - No, change in appetite - Yes, diminished concentration or cognition or indecisiveness - Yes, PMA/R -  Yes, excessive guilt or hopelessness or worthlessness - Yes, suicidal ideations - No     Changes in Sleep: trouble with initiation- No, maintenance, - No early morning awakening with inability to return to sleep - No, hypersomnolence - No     Suicidal- active/passive ideations - No, organized plans, future intentions - No     Homicidal ideations: active/passive ideations - No, organized plans, future intentions - No     Symptoms of psychosis: hallucinations - No, delusions - No, disorganized speech - No, disorganized behavior or abnormal motor behavior - No, or negative symptoms (diminshed emotional expression, avolition, anhedonia, alogia, asociality) - No, active phase symptoms >1 month - No, continuous signs of illness > 6 months - No, since onset of illness decreased level of functioning present - No     Symptoms of sonia or hypomania: elevated, expansive, or " "irritable mood with increased energy or activity - No; > 4 days - No,  >7 days - No; with inflated self-esteem or grandiosity - No, decreased need for sleep - No, increased rate of speech - No, FOI or racing thoughts - No, distractibility - No, increased goal directed activity or PMA - No, risky/disinhibited behavior - No     Symptoms of KAILYN: excessive anxiety/worry/fear, more days than not, about numerous issues - No, ongoing for >6 months - No, difficult to control - No, with restlessness - No, fatigue - No, poor concentration - No, irritability - No, muscle tension - No, sleep disturbance - No; causes functionally impairing distress - No     Symptoms of Panic Disorder: recurrent panic attacks (palpitations/heart racing, sweating, shakiness, dyspnea, choking, chest pain/discomfort, Gi symptoms, dizzy/lightheadedness, hot/col flashes, paresthesias, derealization, fear of losing control or fear of dying or fear of "going crazy") - No, precipitated - No, un-precipitated - No, source of worry and/or behavioral changes secondary for 1 month or longer- No, agoraphobia - No     Symptoms of PTSD: h/o trauma exposure - No; re-experiencing/intrusive symptoms - No, avoidant behavior - No, 2 or more negative alterations in cognition or mood - No, 2 or more hyperarousal symptoms - No; with dissociative symptoms - No, ongoing for 1 or more  months - No     Symptoms of OCD: obsessions (recurrent thoughts/urges/images; intrusive and/or unwanted; uses other thoughts/actions to suppress) - No; compulsions (repetitive behaviors used to lower distress/anxiety/obsessions) - No, time-consuming (over 1 hour per day) or cause significant distress/impairment - - No     Symptoms of Anorexia: restriction of caloric intake leading to significantly low body weight - No, intense fear of gaining weight or persistent behavior that interferes with weight gain even thought at a significantly low weight - No, disturbance in the way in which one's " body weight or shape is experienced, undue influence of body weight or shape on self evaluation, or persistent lack of recognition of the seriousness of the current low body weight - No     Symptoms of Bulimia: recurrent episodes of binge eating (definitely larger amount  than what others would eat and lack of a sense of control over eating during episode) - No, recurrent inappropriate compensatory behaviors in order to prevent weight gain (fasting, medications, exercise, vomiting) - No, binges and compensatory behaviors both occur on average at least once a week for 3 months - No, self evaluations is unduly influenced by body shape/weight- - No     Symptoms of Binge eating: recurrent episodes of binge eating (definitely larger amount than what others would eat and lack of a sense of control over eating during episode) - No, 3 or more of following (eating much more rapidly, eating until uncomfortably full, large amounts when not hungry, eating alone because of embarrassed by how much,  feeling disgusted with oneself, depressed or very guilty afterward) - No, distress regarding binges - No, binges occur on average at least once a week for 3 months - No    Procedures Performed: * No surgery found *    Hospital Course:    Patient was admitted to the inpatient psychiatry unit after being medically cleared in the ED. Chart and labs were reviewed. The patient was stabilized as follows:      Mood disorder  - continue increased remeron 7.5 mg PO qhs to 15 mg PO qhs  - continue zyprexa 10 mg PO qhs   - pt counseled  - follow up with outpatient mental health providers after discharge for medication management and psychotherapy     Unspecified anxiety disorder  - continue hydroxyzine 50 mg PO q 6 hours PRN  - pt counseled  - follow up with outpatient mental health providers after discharge for medication management and psychotherapy     Amphetamine abuse  - continue valium 10 mg PO TID for detox to assist with agitation   -decrease to BID  then stop; taper completed without incident  - pt counseled  - SW consulted for assistance with additional resources   - consider rehab     Opiate abuse  - pt requesting vivitrol, will plan to administer prior to discharge once detox controlled if PO challenge successful  - start naltrexone 50 mg PO qd today- send home with 25 mg dosage (50 mg dosage caused recurrence of w/d symptoms)  - prns for sx w/d  - pt counseled  - LEE consulted for assistance with additional resources   - consider rehab- pt declined        Nicotine dependence  - continue nicotine patch 14 mg PO qd PRN        During hospitalization, the patient was encouraged to go to both groups and individual counseling. Patient was monitored for any side effects. A meeting was held with multidisciplinary team prior to discharge and pt's diagnosis, current medications, and follow up were discussed. The patient has been compliant with treatment and can adequately attend to activities of daily living in an independent manner. The patient denies any side effects. The patient denies SI, HI, plan or intent for self harm or harm to others. The patient is no longer a danger to self or others nor gravely disabled disabled. Patient discharged  in stable condition with scheduled outpatient follow up.    He denied withdrawals; her has some cravings; He requested a rx for naltrexone. He declined rehab.    AIMS score 0    The patient reports improved symptoms as documented below. The patient is requesting discharge. The patient is currently stable for discharge home and is able/willing to attend outpatient care. The patient is hopeful, future oriented and goal directed. The patient readily discusses both short and long term goals. The patient can identify positive coping skills and social support.     Psychiatric ROS (observed, reported, or endorsed/denied):  Depressed mood - improved  Interest/pleasure/anhedonia:  improved  Guilt/hopelessness/worthlessness -improved  Changes in Sleep - improved  Changes in Appetite - improved  Changes in Concentration - improved  Changes in Energy - improved  PMA/R- no  Suicidal- active/passive ideations - No  Homicidal ideations: active/passive ideations - No     Hallucinations - improved  Delusions - improved  Disorganized behavior - No  Disorganized speech - No  Negative symptoms - No     Elevated mood - No  Decreased need for sleep - No  Grandiosity - No  Racing thoughts - No  Impulsivity - No  Irritability- No  Increased energy - No  Distractibility - No  Increase in goal-directed activity or PMA- No     Symptoms of KAILYN - improved  Symptoms of Panic Disorder- No  Symptoms of PTSD - No      Discussed diagnosis, risks and benefits of proposed treatment vs alternative treatments vs no treatment, and potential side effects of these treatments.  The patient expresses understanding of the above and displays the capacity to agree with this treatment given said understanding.  Patient also agrees that, currently, the benefits outweigh the risks and would like to pursue treatment at this time.      Discharge MSE: stated age, casually dressed, well groomed.  No psychomotor agitation or retardation.  No abnormal involuntary movements.  Gait normal.  Speech normal, conversational.  Language fluent English. Mood fine.  Affect normal range, pleasant, euthymic.  Thought process linear.  Associations intact.  Denies suicidal or homicidal ideation.  Denies auditory hallucinations, paranoid ideation, ideas of reference.  Memory intact.  Attention intact.  Fund of knowledge intact.  Insight intact.  Judgment intact.  Alert and oriented to person, place, time.      Tobacco Usage:  Is patient a smoker? Yes  Does patient want prescription for Tobacco Cessation? No  Does patient want counseling for Tobacco Cessation? No    If patient would like to quit, then over the counter nicotine patch could be used. The  patient could also follow up with his PCP or psychiatric provider for other alternatives.     Tobacco Use Treatment Practical Counseling    Were the following discussed with the patient:    Recognizing danger situations:    A. Alcohol use during the first month after quitting - Yes   B. Being around smoke and/or smokers or time/situations when the patient routinely smoked - Yes   C. Triggers and/or roadblocks are the same as danger situations - Yes    2.   Developing coping skills   A. Learning new ways to manage stress - Yes   B. Exercising and/or relaxation breathing - Yes   C. Changing routines - Yes   D. Distraction techniques to prevent tobacco use - Yes    3.   Basic information about quitting:   A. Benefits of quitting tobacco - Yes   B. How to quit techniques - Yes   C. Available resources to support quitting - Yes        Final Diagnoses:    Principal Problem: Unspecified mod disorder (r/o SIMD vs primary mood disorder)   Secondary Diagnoses:   Unspecified anxiety disorder  Amphetamine abuse  Opiate abuse  Nicotine dependence    Labs:  Admission on 02/19/2024   Component Date Value Ref Range Status    HIV Rapid Testing 02/19/2024 Non-Reactive  Negative Final    Chlamydia, Amplified DNA 02/21/2024 Not Detected  Not Detected Final    N gonorrhoeae, amplified DNA 02/21/2024 Not Detected  Not Detected Final   Admission on 02/19/2024, Discharged on 02/19/2024   Component Date Value Ref Range Status    WBC 02/19/2024 7.56  3.90 - 12.70 K/uL Final    RBC 02/19/2024 5.13  4.60 - 6.20 M/uL Final    Hemoglobin 02/19/2024 14.2  14.0 - 18.0 g/dL Final    Hematocrit 02/19/2024 43.8  40.0 - 54.0 % Final    MCV 02/19/2024 85  82 - 98 fL Final    MCH 02/19/2024 27.7  27.0 - 31.0 pg Final    MCHC 02/19/2024 32.4  32.0 - 36.0 g/dL Final    RDW 02/19/2024 13.2  11.5 - 14.5 % Final    Platelets 02/19/2024 279  150 - 450 K/uL Final    MPV 02/19/2024 9.3  9.2 - 12.9 fL Final    Immature Granulocytes 02/19/2024 0.5  0.0 - 0.5 %  Final    Gran # (ANC) 02/19/2024 5.5  1.8 - 7.7 K/uL Final    Immature Grans (Abs) 02/19/2024 0.04  0.00 - 0.04 K/uL Final    Lymph # 02/19/2024 1.3  1.0 - 4.8 K/uL Final    Mono # 02/19/2024 0.5  0.3 - 1.0 K/uL Final    Eos # 02/19/2024 0.2  0.0 - 0.5 K/uL Final    Baso # 02/19/2024 0.05  0.00 - 0.20 K/uL Final    nRBC 02/19/2024 0  0 /100 WBC Final    Gran % 02/19/2024 72.9  38.0 - 73.0 % Final    Lymph % 02/19/2024 16.8 (L)  18.0 - 48.0 % Final    Mono % 02/19/2024 7.1  4.0 - 15.0 % Final    Eosinophil % 02/19/2024 2.0  0.0 - 8.0 % Final    Basophil % 02/19/2024 0.7  0.0 - 1.9 % Final    Differential Method 02/19/2024 Automated   Final    Benzodiazepines 02/19/2024 Negative  Negative Final    Methadone metabolites 02/19/2024 Negative  Negative Final    Cocaine (Metab.) 02/19/2024 Negative  Negative Final    Opiate Scrn, Ur 02/19/2024 Presumptive Positive (A)  Negative Final    Barbiturate Screen, Ur 02/19/2024 Negative  Negative Final    Amphetamine Screen, Ur 02/19/2024 Presumptive Positive (A)  Negative Final    THC 02/19/2024 Presumptive Positive (A)  Negative Final    Phencyclidine 02/19/2024 Negative  Negative Final    Creatinine, Urine 02/19/2024 354.8  23.0 - 375.0 mg/dL Final    Toxicology Information 02/19/2024 SEE COMMENT   Final    TSH 02/19/2024 2.738  0.400 - 4.000 uIU/mL Final    Specimen UA 02/19/2024 Urine, Clean Catch   Final    Color, UA 02/19/2024 Yellow  Yellow, Straw, Kelly Final    Appearance, UA 02/19/2024 Clear  Clear Final    pH, UA 02/19/2024 6.0  5.0 - 8.0 Final    Specific Gravity, UA 02/19/2024 >=1.030 (A)  1.005 - 1.030 Final    Protein, UA 02/19/2024 Negative  Negative Final    Glucose, UA 02/19/2024 Trace (A)  Negative Final    Ketones, UA 02/19/2024 Trace (A)  Negative Final    Bilirubin (UA) 02/19/2024 1+ (A)  Negative Final    Occult Blood UA 02/19/2024 Negative  Negative Final    Nitrite, UA 02/19/2024 Negative  Negative Final    Urobilinogen, UA 02/19/2024 Negative  <2.0 EU/dL  Final    Leukocytes, UA 02/19/2024 Negative  Negative Final    Salicylate Lvl 02/19/2024 <5.0 (L)  15.0 - 30.0 mg/dL Final    Acetaminophen (Tylenol), Serum 02/19/2024 <3.0 (L)  10.0 - 20.0 ug/mL Final    Alcohol, Serum 02/19/2024 <10  <10 mg/dL Final    Sodium 02/19/2024 140  136 - 145 mmol/L Final    Potassium 02/19/2024 3.6  3.5 - 5.1 mmol/L Final    Chloride 02/19/2024 103  95 - 110 mmol/L Final    CO2 02/19/2024 28  23 - 29 mmol/L Final    Glucose 02/19/2024 126 (H)  70 - 110 mg/dL Final    BUN 02/19/2024 24 (H)  6 - 20 mg/dL Final    Creatinine 02/19/2024 0.9  0.5 - 1.4 mg/dL Final    Calcium 02/19/2024 9.7  8.7 - 10.5 mg/dL Final    Total Protein 02/19/2024 7.7  6.0 - 8.4 g/dL Final    Albumin 02/19/2024 4.1  3.5 - 5.2 g/dL Final    Total Bilirubin 02/19/2024 0.5  0.1 - 1.0 mg/dL Final    Alkaline Phosphatase 02/19/2024 68  55 - 135 U/L Final    AST 02/19/2024 17  10 - 40 U/L Final    ALT 02/19/2024 22  10 - 44 U/L Final    eGFR 02/19/2024 >60  >60 mL/min/1.73 m^2 Final    Anion Gap 02/19/2024 9  8 - 16 mmol/L Final         Discharged Condition: stable and improved; not currently a danger to self/others or gravely disabled    Disposition: Home or Self Care    Is patient being discharged on multiple neuroleptics? No    Follow Up/Patient Instructions:     Take all medications as prescribed.  Attend all psychiatric and medical follow up appointments.   Abstain from all drugs and alcohol.  Call the crisis line at: [phone] for help in a crisis and emergent situations or call 911 and Return to ED for any acute worsening of your condition including suicidal or homicidal ideations      No discharge procedures on file.   Follow-up Information       [facility], Start. Go on 3/13/2024.    Specialty: Internal Medicine  Why: as scheduled on March 13, 2024 at 1:20pm  for, medication management  Contact information:  [address] [place] [address]  [place] [address] [phone]                               Follow up apt: as  above      Medications:  Reconciled Home Medications:      Medication List        START taking these medications      hydrOXYzine 50 MG tablet  Commonly known as: ATARAX  Take 1 tablet (50 mg total) by mouth 3 (three) times daily as needed for Anxiety.     naltrexone 50 mg tablet  Commonly known as: DEPADE  Take 25 mg by mouth once daily.     OLANZapine 10 MG tablet  Commonly known as: ZyPREXA  Take 1 tablet (10 mg total) by mouth every evening.            CHANGE how you take these medications      * nicotine 14 mg/24 hr  Commonly known as: NICODERM CQ  Place 1 patch onto the skin once daily.  What changed: Another medication with the same name was added. Make sure you understand how and when to take each.     * nicotine 14 mg/24 hr  Commonly known as: NICODERM CQ  Place 1 patch onto the skin once daily.  What changed: You were already taking a medication with the same name, and this prescription was added. Make sure you understand how and when to take each.           * This list has 2 medication(s) that are the same as other medications prescribed for you. Read the directions carefully, and ask your doctor or other care provider to review them with you.                CONTINUE taking these medications      mirtazapine 15 MG tablet  Commonly known as: REMERON  Take 1 tablet (15 mg total) by mouth every evening.            STOP taking these medications      hydrOXYzine pamoate 50 MG Cap  Commonly known as: VISTARIL                Diet: regular     Activity as tolerated    Total time spent discharging patient: 35 minutes    Anshul Abraham MD  Psychiatry